# Patient Record
Sex: FEMALE | Race: WHITE | NOT HISPANIC OR LATINO | Employment: OTHER | URBAN - METROPOLITAN AREA
[De-identification: names, ages, dates, MRNs, and addresses within clinical notes are randomized per-mention and may not be internally consistent; named-entity substitution may affect disease eponyms.]

---

## 2018-06-13 ENCOUNTER — TRANSFERRED RECORDS (OUTPATIENT)
Dept: HEALTH INFORMATION MANAGEMENT | Facility: CLINIC | Age: 73
End: 2018-06-13

## 2018-08-09 ENCOUNTER — TRANSFERRED RECORDS (OUTPATIENT)
Dept: HEALTH INFORMATION MANAGEMENT | Facility: CLINIC | Age: 73
End: 2018-08-09

## 2018-12-07 ENCOUNTER — TRANSFERRED RECORDS (OUTPATIENT)
Dept: HEALTH INFORMATION MANAGEMENT | Facility: CLINIC | Age: 73
End: 2018-12-07

## 2018-12-19 ENCOUNTER — TRANSFERRED RECORDS (OUTPATIENT)
Dept: HEALTH INFORMATION MANAGEMENT | Facility: CLINIC | Age: 73
End: 2018-12-19

## 2019-02-22 ENCOUNTER — TRANSFERRED RECORDS (OUTPATIENT)
Dept: HEALTH INFORMATION MANAGEMENT | Facility: CLINIC | Age: 74
End: 2019-02-22

## 2022-05-08 ENCOUNTER — HOSPITAL ENCOUNTER (OUTPATIENT)
Facility: CLINIC | Age: 77
Setting detail: OBSERVATION
Discharge: HOME | End: 2022-05-09
Attending: EMERGENCY MEDICINE | Admitting: INTERNAL MEDICINE
Payer: COMMERCIAL

## 2022-05-08 ENCOUNTER — APPOINTMENT (OUTPATIENT)
Dept: CT IMAGING | Facility: CLINIC | Age: 77
End: 2022-05-08
Attending: EMERGENCY MEDICINE
Payer: COMMERCIAL

## 2022-05-08 ENCOUNTER — APPOINTMENT (OUTPATIENT)
Dept: GENERAL RADIOLOGY | Facility: CLINIC | Age: 77
End: 2022-05-08
Attending: EMERGENCY MEDICINE
Payer: COMMERCIAL

## 2022-05-08 DIAGNOSIS — I30.1 ACUTE VIRAL PERICARDITIS: ICD-10-CM

## 2022-05-08 DIAGNOSIS — U07.1 INFECTION DUE TO 2019 NOVEL CORONAVIRUS: ICD-10-CM

## 2022-05-08 LAB
ALBUMIN SERPL-MCNC: 3.4 G/DL (ref 3.4–5)
ALP SERPL-CCNC: 52 U/L (ref 40–150)
ALT SERPL W P-5'-P-CCNC: 27 U/L (ref 0–50)
ANION GAP SERPL CALCULATED.3IONS-SCNC: 7 MMOL/L (ref 3–14)
AST SERPL W P-5'-P-CCNC: 30 U/L (ref 0–45)
ATRIAL RATE - MUSE: 74 BPM
BASOPHILS # BLD AUTO: 0 10E3/UL (ref 0–0.2)
BASOPHILS NFR BLD AUTO: 0 %
BILIRUB SERPL-MCNC: 0.5 MG/DL (ref 0.2–1.3)
BUN SERPL-MCNC: 29 MG/DL (ref 7–30)
CALCIUM SERPL-MCNC: 8.5 MG/DL (ref 8.5–10.1)
CHLORIDE BLD-SCNC: 99 MMOL/L (ref 94–109)
CO2 SERPL-SCNC: 26 MMOL/L (ref 20–32)
CREAT SERPL-MCNC: 0.9 MG/DL (ref 0.52–1.04)
CRP SERPL-MCNC: 18.8 MG/L (ref 0–8)
D DIMER PPP FEU-MCNC: 1.05 UG/ML FEU (ref 0–0.5)
DIASTOLIC BLOOD PRESSURE - MUSE: NORMAL MMHG
EOSINOPHIL # BLD AUTO: 0 10E3/UL (ref 0–0.7)
EOSINOPHIL NFR BLD AUTO: 1 %
ERYTHROCYTE [DISTWIDTH] IN BLOOD BY AUTOMATED COUNT: 12.9 % (ref 10–15)
ERYTHROCYTE [SEDIMENTATION RATE] IN BLOOD BY WESTERGREN METHOD: 36 MM/HR (ref 0–30)
GFR SERPL CREATININE-BSD FRML MDRD: 65 ML/MIN/1.73M2
GLUCOSE BLD-MCNC: 82 MG/DL (ref 70–99)
HCT VFR BLD AUTO: 35.5 % (ref 35–47)
HGB BLD-MCNC: 12.1 G/DL (ref 11.7–15.7)
IMM GRANULOCYTES # BLD: 0 10E3/UL
IMM GRANULOCYTES NFR BLD: 0 %
INTERPRETATION ECG - MUSE: NORMAL
LYMPHOCYTES # BLD AUTO: 1.3 10E3/UL (ref 0.8–5.3)
LYMPHOCYTES NFR BLD AUTO: 26 %
MCH RBC QN AUTO: 32.2 PG (ref 26.5–33)
MCHC RBC AUTO-ENTMCNC: 34.1 G/DL (ref 31.5–36.5)
MCV RBC AUTO: 94 FL (ref 78–100)
MONOCYTES # BLD AUTO: 1 10E3/UL (ref 0–1.3)
MONOCYTES NFR BLD AUTO: 20 %
NEUTROPHILS # BLD AUTO: 2.6 10E3/UL (ref 1.6–8.3)
NEUTROPHILS NFR BLD AUTO: 53 %
NRBC # BLD AUTO: 0 10E3/UL
NRBC BLD AUTO-RTO: 0 /100
P AXIS - MUSE: 64 DEGREES
PLATELET # BLD AUTO: 234 10E3/UL (ref 150–450)
POTASSIUM BLD-SCNC: 3.8 MMOL/L (ref 3.4–5.3)
PR INTERVAL - MUSE: 148 MS
PROT SERPL-MCNC: 7.5 G/DL (ref 6.8–8.8)
QRS DURATION - MUSE: 82 MS
QT - MUSE: 370 MS
QTC - MUSE: 410 MS
R AXIS - MUSE: 35 DEGREES
RBC # BLD AUTO: 3.76 10E6/UL (ref 3.8–5.2)
SODIUM SERPL-SCNC: 132 MMOL/L (ref 133–144)
SYSTOLIC BLOOD PRESSURE - MUSE: NORMAL MMHG
T AXIS - MUSE: 58 DEGREES
TROPONIN I SERPL HS-MCNC: 8 NG/L
VENTRICULAR RATE- MUSE: 74 BPM
WBC # BLD AUTO: 5.1 10E3/UL (ref 4–11)

## 2022-05-08 PROCEDURE — 99285 EMERGENCY DEPT VISIT HI MDM: CPT | Mod: 25

## 2022-05-08 PROCEDURE — 250N000009 HC RX 250: Performed by: EMERGENCY MEDICINE

## 2022-05-08 PROCEDURE — 85652 RBC SED RATE AUTOMATED: CPT | Performed by: EMERGENCY MEDICINE

## 2022-05-08 PROCEDURE — 86140 C-REACTIVE PROTEIN: CPT | Performed by: EMERGENCY MEDICINE

## 2022-05-08 PROCEDURE — 71275 CT ANGIOGRAPHY CHEST: CPT

## 2022-05-08 PROCEDURE — 250N000011 HC RX IP 250 OP 636: Performed by: EMERGENCY MEDICINE

## 2022-05-08 PROCEDURE — 85025 COMPLETE CBC W/AUTO DIFF WBC: CPT | Performed by: EMERGENCY MEDICINE

## 2022-05-08 PROCEDURE — 94640 AIRWAY INHALATION TREATMENT: CPT

## 2022-05-08 PROCEDURE — 36415 COLL VENOUS BLD VENIPUNCTURE: CPT | Performed by: EMERGENCY MEDICINE

## 2022-05-08 PROCEDURE — 80053 COMPREHEN METABOLIC PANEL: CPT | Performed by: EMERGENCY MEDICINE

## 2022-05-08 PROCEDURE — 87040 BLOOD CULTURE FOR BACTERIA: CPT | Performed by: EMERGENCY MEDICINE

## 2022-05-08 PROCEDURE — 85379 FIBRIN DEGRADATION QUANT: CPT | Performed by: EMERGENCY MEDICINE

## 2022-05-08 PROCEDURE — 84484 ASSAY OF TROPONIN QUANT: CPT | Performed by: EMERGENCY MEDICINE

## 2022-05-08 PROCEDURE — 71045 X-RAY EXAM CHEST 1 VIEW: CPT

## 2022-05-08 PROCEDURE — 93005 ELECTROCARDIOGRAM TRACING: CPT

## 2022-05-08 PROCEDURE — C9803 HOPD COVID-19 SPEC COLLECT: HCPCS

## 2022-05-08 RX ORDER — CALCIUM CARBONATE 500(1250)
1 TABLET ORAL 2 TIMES DAILY
COMMUNITY

## 2022-05-08 RX ORDER — IPRATROPIUM BROMIDE AND ALBUTEROL SULFATE 2.5; .5 MG/3ML; MG/3ML
3 SOLUTION RESPIRATORY (INHALATION) ONCE
Status: COMPLETED | OUTPATIENT
Start: 2022-05-08 | End: 2022-05-08

## 2022-05-08 RX ORDER — IOPAMIDOL 755 MG/ML
58 INJECTION, SOLUTION INTRAVASCULAR ONCE
Status: COMPLETED | OUTPATIENT
Start: 2022-05-08 | End: 2022-05-08

## 2022-05-08 RX ADMIN — IOPAMIDOL 58 ML: 755 INJECTION, SOLUTION INTRAVENOUS at 22:57

## 2022-05-08 RX ADMIN — IPRATROPIUM BROMIDE AND ALBUTEROL SULFATE 3 ML: .5; 3 SOLUTION RESPIRATORY (INHALATION) at 21:58

## 2022-05-08 RX ADMIN — SODIUM CHLORIDE 85 ML: 900 INJECTION INTRAVENOUS at 22:57

## 2022-05-08 ASSESSMENT — ENCOUNTER SYMPTOMS
VOMITING: 0
CHILLS: 0
SHORTNESS OF BREATH: 1
WHEEZING: 1
COUGH: 1
FATIGUE: 1
DIARRHEA: 1
FEVER: 0
SORE THROAT: 1

## 2022-05-08 NOTE — ED TRIAGE NOTES
Patient reports coughing - productive. History of lung and heart problems. Patient reports some chest pressure yesterday. Now improved. Patient reports difficulty clearing lungs and feels like she is choking. Patient reports recent Covid diagnosis today. Symptoms started last Thursday afternoon.      Triage Assessment     Row Name 05/08/22 1800       Triage Assessment (Adult)    Airway WDL WDL    Additional Documentation --  Patient reports asthma and wheezing       Respiratory WDL    Respiratory WDL --  patient reports shortness of breath - worse with activity       Skin Circulation/Temperature WDL    Skin Circulation/Temperature WDL --  Patient reports rash       Cardiac WDL    Cardiac WDL WDL       Cognitive/Neuro/Behavioral WDL    Cognitive/Neuro/Behavioral WDL WDL

## 2022-05-09 ENCOUNTER — APPOINTMENT (OUTPATIENT)
Dept: CARDIOLOGY | Facility: CLINIC | Age: 77
End: 2022-05-09
Attending: INTERNAL MEDICINE
Payer: COMMERCIAL

## 2022-05-09 VITALS
HEIGHT: 64 IN | HEART RATE: 73 BPM | OXYGEN SATURATION: 95 % | TEMPERATURE: 99 F | WEIGHT: 143.96 LBS | RESPIRATION RATE: 16 BRPM | SYSTOLIC BLOOD PRESSURE: 119 MMHG | BODY MASS INDEX: 24.58 KG/M2 | DIASTOLIC BLOOD PRESSURE: 63 MMHG

## 2022-05-09 PROBLEM — U07.1 INFECTION DUE TO 2019 NOVEL CORONAVIRUS: Status: ACTIVE | Noted: 2022-05-09

## 2022-05-09 PROBLEM — I30.1 ACUTE VIRAL PERICARDITIS: Status: ACTIVE | Noted: 2022-05-09

## 2022-05-09 LAB
CK SERPL-CCNC: 136 U/L (ref 30–225)
CRP SERPL-MCNC: 13.3 MG/L (ref 0–8)
CRP SERPL-MCNC: 13.5 MG/L (ref 0–8)
ERYTHROCYTE [SEDIMENTATION RATE] IN BLOOD BY WESTERGREN METHOD: 35 MM/HR (ref 0–30)
LVEF ECHO: NORMAL
SODIUM SERPL-SCNC: 133 MMOL/L (ref 133–144)
TROPONIN I SERPL HS-MCNC: 10 NG/L
TROPONIN I SERPL HS-MCNC: 11 NG/L
TROPONIN I SERPL HS-MCNC: 13 NG/L

## 2022-05-09 PROCEDURE — 93325 DOPPLER ECHO COLOR FLOW MAPG: CPT | Mod: 26 | Performed by: INTERNAL MEDICINE

## 2022-05-09 PROCEDURE — 93321 DOPPLER ECHO F-UP/LMTD STD: CPT | Mod: 26 | Performed by: INTERNAL MEDICINE

## 2022-05-09 PROCEDURE — 84484 ASSAY OF TROPONIN QUANT: CPT | Mod: 91 | Performed by: INTERNAL MEDICINE

## 2022-05-09 PROCEDURE — 86140 C-REACTIVE PROTEIN: CPT | Performed by: HOSPITALIST

## 2022-05-09 PROCEDURE — 255N000002 HC RX 255 OP 636: Performed by: HOSPITALIST

## 2022-05-09 PROCEDURE — 999N000208 ECHOCARDIOGRAM LIMITED

## 2022-05-09 PROCEDURE — G0378 HOSPITAL OBSERVATION PER HR: HCPCS

## 2022-05-09 PROCEDURE — 99236 HOSP IP/OBS SAME DATE HI 85: CPT | Performed by: INTERNAL MEDICINE

## 2022-05-09 PROCEDURE — 250N000011 HC RX IP 250 OP 636: Performed by: HOSPITALIST

## 2022-05-09 PROCEDURE — 99204 OFFICE O/P NEW MOD 45 MIN: CPT | Mod: 25 | Performed by: INTERNAL MEDICINE

## 2022-05-09 PROCEDURE — 36415 COLL VENOUS BLD VENIPUNCTURE: CPT | Performed by: INTERNAL MEDICINE

## 2022-05-09 PROCEDURE — 84295 ASSAY OF SERUM SODIUM: CPT | Performed by: HOSPITALIST

## 2022-05-09 PROCEDURE — 36415 COLL VENOUS BLD VENIPUNCTURE: CPT | Performed by: EMERGENCY MEDICINE

## 2022-05-09 PROCEDURE — C8924 2D TTE W OR W/O FOL W/CON,FU: HCPCS

## 2022-05-09 PROCEDURE — 86140 C-REACTIVE PROTEIN: CPT | Performed by: INTERNAL MEDICINE

## 2022-05-09 PROCEDURE — 36415 COLL VENOUS BLD VENIPUNCTURE: CPT | Performed by: HOSPITALIST

## 2022-05-09 PROCEDURE — 99207 PR CDG-CODE CATEGORY CHANGED: CPT | Performed by: INTERNAL MEDICINE

## 2022-05-09 PROCEDURE — 93308 TTE F-UP OR LMTD: CPT | Mod: 26 | Performed by: INTERNAL MEDICINE

## 2022-05-09 PROCEDURE — 82550 ASSAY OF CK (CPK): CPT | Performed by: HOSPITALIST

## 2022-05-09 PROCEDURE — 85652 RBC SED RATE AUTOMATED: CPT | Performed by: INTERNAL MEDICINE

## 2022-05-09 PROCEDURE — 87040 BLOOD CULTURE FOR BACTERIA: CPT | Performed by: EMERGENCY MEDICINE

## 2022-05-09 PROCEDURE — 99207 PR NO CHARGE LOS: CPT | Performed by: HOSPITALIST

## 2022-05-09 PROCEDURE — 84484 ASSAY OF TROPONIN QUANT: CPT | Performed by: HOSPITALIST

## 2022-05-09 PROCEDURE — 250N000013 HC RX MED GY IP 250 OP 250 PS 637: Performed by: EMERGENCY MEDICINE

## 2022-05-09 PROCEDURE — 96372 THER/PROPH/DIAG INJ SC/IM: CPT | Performed by: HOSPITALIST

## 2022-05-09 RX ORDER — AMOXICILLIN 250 MG
2 CAPSULE ORAL 2 TIMES DAILY PRN
Status: DISCONTINUED | OUTPATIENT
Start: 2022-05-09 | End: 2022-05-09 | Stop reason: HOSPADM

## 2022-05-09 RX ORDER — ENOXAPARIN SODIUM 100 MG/ML
40 INJECTION SUBCUTANEOUS EVERY 24 HOURS
Status: DISCONTINUED | OUTPATIENT
Start: 2022-05-09 | End: 2022-05-09 | Stop reason: HOSPADM

## 2022-05-09 RX ORDER — PROCHLORPERAZINE MALEATE 5 MG
5 TABLET ORAL EVERY 6 HOURS PRN
Status: DISCONTINUED | OUTPATIENT
Start: 2022-05-09 | End: 2022-05-09 | Stop reason: HOSPADM

## 2022-05-09 RX ORDER — ACETAMINOPHEN 325 MG/1
650 TABLET ORAL EVERY 6 HOURS PRN
Status: DISCONTINUED | OUTPATIENT
Start: 2022-05-09 | End: 2022-05-09 | Stop reason: HOSPADM

## 2022-05-09 RX ORDER — PROCHLORPERAZINE 25 MG
12.5 SUPPOSITORY, RECTAL RECTAL EVERY 12 HOURS PRN
Status: DISCONTINUED | OUTPATIENT
Start: 2022-05-09 | End: 2022-05-09 | Stop reason: HOSPADM

## 2022-05-09 RX ORDER — LIDOCAINE 40 MG/G
CREAM TOPICAL
Status: DISCONTINUED | OUTPATIENT
Start: 2022-05-09 | End: 2022-05-09 | Stop reason: HOSPADM

## 2022-05-09 RX ORDER — ONDANSETRON 2 MG/ML
4 INJECTION INTRAMUSCULAR; INTRAVENOUS EVERY 6 HOURS PRN
Status: DISCONTINUED | OUTPATIENT
Start: 2022-05-09 | End: 2022-05-09 | Stop reason: HOSPADM

## 2022-05-09 RX ORDER — COLCHICINE 0.6 MG/1
1.2 TABLET ORAL ONCE
Status: COMPLETED | OUTPATIENT
Start: 2022-05-09 | End: 2022-05-09

## 2022-05-09 RX ORDER — AMOXICILLIN 250 MG
1 CAPSULE ORAL 2 TIMES DAILY PRN
Status: DISCONTINUED | OUTPATIENT
Start: 2022-05-09 | End: 2022-05-09 | Stop reason: HOSPADM

## 2022-05-09 RX ORDER — ACETAMINOPHEN 650 MG/1
650 SUPPOSITORY RECTAL EVERY 6 HOURS PRN
Status: DISCONTINUED | OUTPATIENT
Start: 2022-05-09 | End: 2022-05-09 | Stop reason: HOSPADM

## 2022-05-09 RX ORDER — BISACODYL 10 MG
10 SUPPOSITORY, RECTAL RECTAL DAILY PRN
Status: DISCONTINUED | OUTPATIENT
Start: 2022-05-09 | End: 2022-05-09 | Stop reason: HOSPADM

## 2022-05-09 RX ORDER — NITROGLYCERIN 0.4 MG/1
0.4 TABLET SUBLINGUAL EVERY 5 MIN PRN
Status: DISCONTINUED | OUTPATIENT
Start: 2022-05-09 | End: 2022-05-09 | Stop reason: HOSPADM

## 2022-05-09 RX ORDER — ONDANSETRON 4 MG/1
4 TABLET, ORALLY DISINTEGRATING ORAL EVERY 6 HOURS PRN
Status: DISCONTINUED | OUTPATIENT
Start: 2022-05-09 | End: 2022-05-09 | Stop reason: HOSPADM

## 2022-05-09 RX ADMIN — COLCHICINE 1.2 MG: 0.6 TABLET ORAL at 01:00

## 2022-05-09 RX ADMIN — HUMAN ALBUMIN MICROSPHERES AND PERFLUTREN 9 ML: 10; .22 INJECTION, SOLUTION INTRAVENOUS at 12:02

## 2022-05-09 NOTE — H&P
Admitted: 05/08/2022    PRIMARY CARE PHYSICIAN:  Dr. Saravia at [facility].     CODE STATUS:  Full code.  Discussed with the patient.    CHIEF COMPLAINT:  COVID and chest tightness.    HISTORY OF PRESENT ILLNESS:  Ms. Fry is a 77-year-old female with a past medical history significant for asthma, dyslipidemia, osteoarthritis, who presents to the Emergency Department with the above concerns.  History is obtained through discussion with the ED physician as well as the patient.      The patient states that she was exposed to her grandchild little over a week ago on Monday, who tested positive for COVID.  She arranged to get a PCR test a few days later on Thursday, which was negative.  A few hours after she got the COVID test, she started having symptoms of nasal congestion, which progressed to a tickle in her throat and coughing, which is occasionally productive of clear sputum.  She has not felt particularly short of breath, but the cough has been substantial.  She took home COVID test today, which was positive.  She also developed some chest tightness this morning, but not pain.  No nausea or radiation of the chest tightness.  No diaphoresis, though she did have a temperature of 99 degrees.  She came into the hospital because of the positive COVID test and her symptoms.  She has not required oxygen.      She denies any abdominal pain, nausea, change in her bowel or urinary habits.  She does have some lower extremity edema, which she describes as a progressive throughout the day when she is either walking or sitting and then it gets better throughout the night and is completely resolved in the morning.  She does tell me she has seen a cardiologist in the past and an echocardiogram that showed some sort of valvular issue, but was told she could follow up in approximately three years and there is no intervention.    In the Emergency Department, the patient had elevated inflammatory markers including CRP and D-dimer, which  prompted a CT of the chest, which was negative for any PE or infiltrate, but did show a small pericardial effusion.  This was discussed with cardiology who recommended that she be brought in for echocardiogram.  Troponin was negative.  Chest x-ray also negative for any acute abnormalities, but did show some artifacts versus air in the esophagus, noted to be a lucency along the medial chest wall.  The patient is currently resting fairly comfortably.    PAST MEDICAL HISTORY:    1.  Asthma.  2.  Dyslipidemia.  3.  Postherpetic neuralgia.  4.  Osteopenia.  5.  Osteoarthritis.  6.  GERD.    MEDICATIONS:    Prior to Admission medications    Medication Sig Last Dose Taking? Auth Provider   albuterol (ALBUTEROL) 108 (90 BASE) MCG/ACT inhaler Inhale 2 puffs into the lungs every 6 hours as needed More than a month at Unknown time Yes Edouard Bustamante MD   calcium carbonate (OS-DENI) 500 MG tablet Take 1 tablet by mouth 2 times daily 5/8/2022 at Unknown time Yes Reported, Patient   fluticasone (FLONASE) 50 MCG/ACT nasal spray Spray 1-2 sprays into both nostrils daily More than a month at Unknown time Yes Edouard Bustamante MD   fluticasone-salmeterol (ADVAIR DISKUS) 250-50 MCG/DOSE diskus inhaler Inhale 1 puff into the lungs every 12 hours as needed Past Week at Unknown time Yes Edouard Bustamante MD   lovastatin (MEVACOR) 40 MG tablet Take 1 tablet (40 mg) by mouth daily 5/8/2022 at Unknown time Yes Scarlet Arce PA-C   MULTI-VITAMIN OR TABS 1 TABLET DAILY 5/8/2022 at Unknown time Yes Reported, Patient   Omega-3 Fatty Acids (EPA PO) Take 1 teaspoonful by mouth daily 5/8/2022 at Unknown time Yes Reported, Patient   omeprazole (PRILOSEC) 40 MG capsule Take 1 capsule (40 mg) by mouth daily Take 30-60 minutes before a meal. More than a month at Unknown time Yes Edouard Bustamante MD         SOCIAL HISTORY:  The patient denies any use of tobacco or alcohol.    FAMILY HISTORY:  Mother had heart  valve issues and rheumatoid arthritis.  Father had a pacer.    ALLERGIES:  SULFA, LEVAQUIN, ASPIRIN, ERYTHROMYCIN, NSAIDS.    REVIEW OF SYSTEMS:  A complete review of systems reviewed and negative, save for the pertinent positives recorded in HPI.    PHYSICAL EXAMINATION:    VITAL SIGNS:  Show blood pressure of 149/83, heart rate 74, respirations 16, satting 97% on room air, temperature 97.3 degrees Fahrenheit.  GENERAL:  The patient is sitting in bed and appears comfortable.  HEENT:  Pupils equal, round, reactive to light.  Extraocular muscle function intact.  No scleral icterus.  Oropharynx is clear.  NECK:  No lymphadenopathy or thyromegaly.  CARDIOVASCULAR:  Heart is regular rate and rhythm without any appreciable murmur, rub, or gallop.  No rub is noted.  GASTROINTESTINAL:  Positive bowel sounds, soft, nontender and nondistended.  SKIN:  No new rashes or lesions.  LYMPHATICS:  Trace edema of the bilateral lower extremities.  PSYCHIATRIC:  Alert and oriented x 3, normal affect.  NEUROLOGIC:  Cranial nerves II through XII are grossly intact.  No focal deficits.    LABORATORY DATA:  CBC, BMP, LFTs are unremarkable save for a sodium of 132.  CRP is 18.8 and sed rate is 36.      CT is negative for acute issues as above, except for a small pericardial effusion.      Chest x-ray shows the medial lucency along the chest, which could be artifactual related to air in the esophagus.    IMPRESSION AND PLAN:  Ms. Fry is a 77-year-old female with a past medical history significant for asthma, dyslipidemia, osteopenia, who presents to the Emergency Department with chest tightness and COVID positive.    1.  COVID infection:  The patient has minimal respiratory symptoms other than cough at this point and does not have any evidence of pneumonia, nor hypoxia.  Supportive treatment is indicated at this point.  2.  Small pericardial effusion:  This was noted on CT and there was some concern about whether this is potentially  related to COVID.  In looking back through Care Everywhere, I note that she had an echocardiogram in 10/2021, which showed a sclerotic aortic valve and also a trivial pericardial effusion at that point.  Certainly worth repeating echocardiogram in the morning with cardiology input to determine whether any additional workup or followup is required at this point.  EKG is nonischemic, but we will check serial troponins and monitor on telemetry.  3.  Asthma:  She is only taking PRNs at this point.  I saw she discontinued Advair about 3-6 months ago.  Can use albuterol inhalers as needed.  4.  Dyslipidemia:  Can resume statin upon discharge.  5.  Gastroesophageal reflux disease:  Continue with proton pump inhibitor.  6. Lucency along the medial chest wall:  Per chest x-ray.  This could be artifactual versus air in the esophagus.  Recommend short-term followup via repeat chest x-ray.  7.  Disposition:  The patient was brought under observation status and potentially could discharge home later on today, if echocardiogram is unremarkable and no further cardiac workup if needed.    Felix Lira DO        D: 2022   T: 2022   MT: [email or web address]    Name:     PERSONLILIANE  MRN:      [id number]        Account:     [id number]   :      1945           Admitted:    2022       Document: [id number]

## 2022-05-09 NOTE — PLAN OF CARE
Goal Outcome Evaluation:    Plan of Care Reviewed With: patient     Overall Patient Progress: improving    Discharge    Patient discharged to home via w/c with spouse  Care plan note  Summary:  COVID +, possible acute viral pericarditis   DATE & TIME: 5/9/22 8021-8731  Cognitive Concerns/ Orientation : A&O x4   BEHAVIOR & AGGRESSION TOOL COLOR: Green  CIWA SCORE: NA   ABNL VS/O2: VSS RA, O2 sats 95% RA, LS clear and exp grunting, intermittent productive coughs.   MOBILITY: IND, steady   PAIN MANAGMENT: Denies  DIET: Regular  BOWEL/BLADDER: incontinent at tmes.   ABNL LAB/BG: CRP 13.8 (18.8)CK total 136, d-dimer 1.05   DRAIN/DEVICES: PIV acccess x1 removed prior to discharge   TELEMETRY RHYTHM: NSR  SKIN: Intact, WNL  TESTS/PROCEDURES: s/o Echo complete today  D/C DAY/GOALS/PLACE: Discharged home today at 1700, pt's spouse provided transportation.   OTHER IMPORTANT INFO: Special precautions for COVID +. Echo complete done, cardiology recommended follow up Echo in 6-8 weeks, cleared for discharge. Went through AVS, pt verbalized understanding. Belongings sent with the pt.           Listed belongings gathered and given to patient (including from security/pharmacy). Yes  Care Plan and Patient education resolved: Yes  Prescriptions if needed, hard copies sent with patient  NA  Medication Bin checked and emptied on discharge Yes  SW/care coordinator/charge RN aware of discharge: Yes

## 2022-05-09 NOTE — PLAN OF CARE
Summary:  COVID +, possible acute viral pericarditis   DATE & TIME: 5/09/22 3329-2350   Cognitive Concerns/ Orientation : A&O x4   BEHAVIOR & AGGRESSION TOOL COLOR: Green  CIWA SCORE: NA   ABNL VS/O2: VSS RA, O2 sats 95-96%  MOBILITY: IND  PAIN MANAGMENT: Denies  DIET: Regular  BOWEL/BLADDER: Incontinent bladder at times per pt, no BM this shift.  ABNL LAB/BG: Na+ 132. CRP 18.8, d-dimer 1.05   DRAIN/DEVICES: PIV saline locked  TELEMETRY RHYTHM: NSR  SKIN: Intact, WNL  TESTS/PROCEDURES: Echo today  D/C DAY/GOALS/PLACE: Discharge pending cardiac work up.  OTHER IMPORTANT INFO: Special precautions for COVID +; Cardiology consulted.

## 2022-05-09 NOTE — ED PROVIDER NOTES
History   Chief Complaint:  Shortness of Breath      HPI   Darleen Fry is a 77 year old female who presents with dyspnea.  The patient believes she was exposed to COVID-19 8 days ago.  She had a test 2 days later that was negative.  Today she is on her fourth day of symptoms which began with nasal congestion and sore throat and is progressed to cough.  She had a home COVID test today that was positive.  She has a history of asthma but has been off of her Advair.  She had previously used prednisone but does not use this either.  She has complained of some wheezing and shortness of breath similar to her asthma.  She is never had a DVT or PE.  No leg swelling or hemoptysis or calf tenderness.  She is not having chest pain.  No vomiting.  She has had some loose stools in the last 2 days.  No blood.    Review of Systems   Constitutional: Positive for fatigue. Negative for chills and fever.   HENT: Positive for congestion and sore throat.    Respiratory: Positive for cough, shortness of breath and wheezing.    Cardiovascular: Negative for chest pain and leg swelling.   Gastrointestinal: Positive for diarrhea. Negative for vomiting.   All other systems reviewed and are negative.        Allergies:      Alatrofloxacin Mesylate Injection  Aspirin  Cefzil  Erythromycin  Nsaids  Sulfa Drugs  Levaquin [Levofloxacin]      Medications:      albuterol (ALBUTEROL) 108 (90 BASE) MCG/ACT inhaler  calcium carbonate (OS-DENI) 500 MG tablet  fluticasone (FLONASE) 50 MCG/ACT nasal spray  fluticasone-salmeterol (ADVAIR DISKUS) 250-50 MCG/DOSE diskus inhaler  lovastatin (MEVACOR) 40 MG tablet  MULTI-VITAMIN OR TABS  Omega-3 Fatty Acids (EPA PO)  omeprazole (PRILOSEC) 40 MG capsule        Past Medical History:        Past Medical History:   Diagnosis Date     Asthma      Hyperlipidemia LDL goal <100      Osteopenia      Seasonal allergies      Patient Active Problem List    Diagnosis Date Noted     Acute viral pericarditis 05/09/2022      Priority: Medium     Infection due to 2019 novel coronavirus 05/09/2022     Priority: Medium     Osteopenia 10/09/2013     Priority: Medium     Microscopic hematuria 10/07/2013     Priority: Medium     Urinary urgency 10/07/2013     Priority: Medium     Myalgia and myositis 10/07/2013     Priority: Medium     Low back pain 10/07/2013     Priority: Medium     Diagnosis updated by automated process. Provider to review and confirm.       Hyperlipidemia LDL goal <100 06/03/2013     Priority: Medium     Upper arm pain 01/09/2013     Priority: Medium     Other diseases of nasal cavity and sinuses(478.19) 09/22/2012     Priority: Medium     Constipation 09/22/2012     Priority: Medium     Problem list name updated by automated process. Provider to review       Urinary frequency 09/22/2012     Priority: Medium     Irritable bowel syndrome 09/22/2012     Priority: Medium     Hypersomnia with sleep apnea 09/22/2012     Priority: Medium     Problem list name updated by automated process. Provider to review       Moderate persistent asthma 09/22/2012     Priority: Medium     Obsessive-compulsive disorder 09/22/2012     Priority: Medium     Problem list name updated by automated process. Provider to review       Anxiety state 09/22/2012     Priority: Medium     Problem list name updated by automated process. Provider to review       Deficiency of other vitamins 09/22/2012     Priority: Medium     Sleep apnea 09/22/2012     Priority: Medium     Contact dermatitis and other eczema, due to unspecified cause 09/22/2012     Priority: Medium        Past Surgical History:        Past Surgical History:   Procedure Laterality Date     D & C  2010      NASAL SURG PROC UNLISTED  1989 - POLYPS X 7 - 8/ 08/2007      TOOTH EXTRACTION W/FORCEP       SURGERY GENERAL IP CONSULT  Urethal caruncle       Family History:        Family History   Problem Relation Age of Onset     Prostate Cancer Father      Arthritis Son         gout      "Connective Tissue Disorder Daughter         SLE     Breast Cancer No family hx of        Social History:  No tobacco, alcohol, or drug abuse    Physical Exam     Patient Vitals for the past 24 hrs:   BP Temp Temp src Pulse Resp SpO2 Height Weight   05/08/22 2200 -- -- -- 71 23 96 % -- --   05/08/22 1804 (!) 154/88 -- -- 76 -- 99 % -- --   05/08/22 1759 -- 97.3  F (36.3  C) Temporal -- 20 -- 1.626 m (5' 4\") 65.3 kg (143 lb 15.4 oz)       Physical Exam  Constitutional:       General: She is not in acute distress.     Appearance: She is not diaphoretic.   HENT:      Head: Atraumatic.      Mouth/Throat:      Pharynx: No oropharyngeal exudate.   Eyes:      General: No scleral icterus.     Pupils: Pupils are equal, round, and reactive to light.   Cardiovascular:      Rate and Rhythm: Normal rate and regular rhythm.      Heart sounds: Normal heart sounds.   Pulmonary:      Effort: No respiratory distress.      Comments: Mild tachypnea.  Scattered wheezes.  Coughs during the exam.  Abdominal:      General: Bowel sounds are normal.      Palpations: Abdomen is soft.      Tenderness: There is no abdominal tenderness.   Musculoskeletal:         General: No tenderness.      Right lower leg: No tenderness. No edema.      Left lower leg: No tenderness. No edema.   Skin:     General: Skin is warm.      Capillary Refill: Capillary refill takes less than 2 seconds.      Findings: No rash.   Neurological:      General: No focal deficit present.      Mental Status: She is disoriented.   Psychiatric:         Mood and Affect: Mood normal.         Behavior: Behavior normal.           Emergency Department Course   ECG:   Rate 74.  Normal sinus rhythm.  Intervals normal.  No acute ST or T wave changes.  No change compared to October 1, 2012.  Imaging:  CT Chest Pulmonary Embolism w Contrast   Final Result   IMPRESSION:   1.  No infiltrate, pleural effusion or pulmonary embolism.   2.  Small pericardial effusion.      XR Chest Port 1 View "   Final Result   IMPRESSION: Stable cardiomediastinal silhouette. No focal consolidation or pleural effusion. Lucency along the medial chest favored to be artifactual or related to air within the esophagus. Short-term PA follow-up suggested for confirmation.        Report per radiology    Laboratory:  Labs Ordered and Resulted from Time of ED Arrival to Time of ED Departure   COMPREHENSIVE METABOLIC PANEL - Abnormal       Result Value    Sodium 132 (*)     Potassium 3.8      Chloride 99      Carbon Dioxide (CO2) 26      Anion Gap 7      Urea Nitrogen 29      Creatinine 0.90      Calcium 8.5      Glucose 82      Alkaline Phosphatase 52      AST 30      ALT 27      Protein Total 7.5      Albumin 3.4      Bilirubin Total 0.5      GFR Estimate 65     D DIMER QUANTITATIVE - Abnormal    D-Dimer Quantitative 1.05 (*)    CRP INFLAMMATION - Abnormal    CRP Inflammation 18.8 (*)    ERYTHROCYTE SEDIMENTATION RATE AUTO - Abnormal    Erythrocyte Sedimentation Rate 36 (*)    CBC WITH PLATELETS AND DIFFERENTIAL - Abnormal    WBC Count 5.1      RBC Count 3.76 (*)     Hemoglobin 12.1      Hematocrit 35.5      MCV 94      MCH 32.2      MCHC 34.1      RDW 12.9      Platelet Count 234      % Neutrophils 53      % Lymphocytes 26      % Monocytes 20      % Eosinophils 1      % Basophils 0      % Immature Granulocytes 0      NRBCs per 100 WBC 0      Absolute Neutrophils 2.6      Absolute Lymphocytes 1.3      Absolute Monocytes 1.0      Absolute Eosinophils 0.0      Absolute Basophils 0.0      Absolute Immature Granulocytes 0.0      Absolute NRBCs 0.0     TROPONIN I - Normal    Troponin I High Sensitivity 8     BLOOD CULTURE   BLOOD CULTURE     Medications   colchicine (COLCYRS) tablet 1.2 mg (has no administration in time range)   ipratropium - albuterol 0.5 mg/2.5 mg/3 mL (DUONEB) neb solution 3 mL (3 mLs Nebulization Given 5/8/22 2158)   iopamidol (ISOVUE-370) solution 58 mL (58 mLs Intravenous Given 5/8/22 2816)   CT scan flush (85 mLs  Intravenous Given 5/8/22 2257)       Disposition:  The patient was admitted to the hospital under the care of Dr. Lira.     Impression & Plan      Medical Decision Making:  This patient presents to the ED with shortness of breath and vague chest discomfort.  She tested positive for COVID-19 yesterday.  She is on day 4 of symptoms.  She is not hypoxic here or febrile.  She does have some wheezing that improved with a nebulizer treatment.  She is hemodynamically stable.  EKG does not show ischemic changes.  I am concerned for pericarditis however.  D-dimer is elevated.  While CT scan is negative for pulmonary embolism she does have a pericardial effusion.  Her inflammatory markers are elevated and I am concerned about pericarditis in the setting of her COVID-19 infection.  She is not hypotensive.  I did speak with Dr. Marcus who is covering cardiology and I appreciate his input.  Our plan will be for admission for observation to get an echocardiogram.  She will be initiated on colchicine today.  Nonsteroidal anti-inflammatory medications will be withheld at this point given her listed allergy.  She will be admitted by the hospitalist service.    There may be a component of an asthma exacerbation.  I will hold off on steroids until we can confirm whether or not she has pericarditis.  She says she has had pericarditis in the past.  I do not see an echocardiogram within the last 10 years or so to determine whether she has a chronic effusion.    Diagnosis:    ICD-10-CM    1. Infection due to 2019 novel coronavirus  U07.1    2. Acute viral pericarditis  I30.1        Discharge Medications:  New Prescriptions    No medications on file          Kendrick Engle MD  05/09/22 0020       Kendrick Engle MD  05/09/22 0021

## 2022-05-09 NOTE — H&P
Admitted: 2022    ADDENDUM    IMPRESSION AND PLAN:    Lucency along the medial chest wall:  Per chest x-ray.  This could be artifactual versus air in the esophagus.  Recommend short-term followup via repeat chest x-ray.    Felix Lira DO        D: 2022   T: 2022   MT: [id number]    Name:     LILIANE MILLER  MRN:      [id number]        Account:     [id number]   :      1945           Admitted:    2022       Document: [id number]

## 2022-05-09 NOTE — CONSULTS
[facility]  Cardiology Consultation     Date of Admission:  5/8/2022    Assessment & Plan   Darleen Fry is a 77 year old female with    1.  Small pericardial effusion, unlikely to be COVID pericarditis in the absence of chest pain  2.  COVID infection    Recommendation;   1.  Pericardial effusion is very small and anteriorly located.  Outside echocardiogram in 2021 also showed pericardial effusion.  Unable to compare due to lack of images.  However, it is reassuring that she does not have any chest pain and EKG does not show changes consistent with pericarditis.  Do not believe she needs colchicine or any additional work-up at this time.  Would recommend repeating an echocardiogram in 6 to 8 weeks for reassessment.  2.  No further recommendations from cardiac standpoint.  Will sign off.    Ankit Bland MD    Code Status    Full Code    Reason for Consult   Reason for consult: Pericardial effusion    Primary Care Physician   Physician No Ref-Primary    Chief Complaint   COVID infection    History of Present Illness   Darleen Fry is a 77 year old female with past medical history of asthma, dyslipidemia, osteoarthritis who presented to the emergency department for a positive COVID 19 test.  The patient was exposed to her grandson who tested positive for COVID.  In terms of symptoms she has runny nose, nonproductive cough and chest congestion.  They did a CT scan of the chest which showed small pericardial effusion for which cardiology has been consulted.  Troponins have been negative.  EKG is nonischemic.  Echocardiogram showed a very small anterior pericardial effusion, no valve disease or wall motion abnormality.  Blood work showed elevated inflammatory markers but were otherwise normal.  Minor coronary calcification on CT chest. LDL 99 mg/dl.     In terms of symptoms, the patient tells me that besides some sinus congestion, major runny nose and nonproductive cough she does not have  any other acute issues.  She however notes that she has what she describes as 'a lot of gas in the belly' which generally moves around.  I asked her multiple times about sharp, stabbing, positional chest pain which is hallmark of pericarditis.  Shedenies having nasal symptoms.     Patient Active Problem List   Diagnosis     Other diseases of nasal cavity and sinuses(218.19)     Constipation     Urinary frequency     Irritable bowel syndrome     Hypersomnia with sleep apnea     Moderate persistent asthma     Obsessive-compulsive disorder     Anxiety state     Deficiency of other vitamins     Sleep apnea     Contact dermatitis and other eczema, due to unspecified cause     Upper arm pain     Hyperlipidemia LDL goal <100     Microscopic hematuria     Urinary urgency     Myalgia and myositis     Low back pain     Osteopenia     Acute viral pericarditis     Infection due to 2019 novel coronavirus       Past Medical History   I have reviewed this patient's medical history and updated it with pertinent information if needed.   Past Medical History:   Diagnosis Date     Asthma      Hyperlipidemia LDL goal <100      Osteopenia      Seasonal allergies        Past Surgical History   I have reviewed this patient's surgical history and updated it with pertinent information if needed.  Past Surgical History:   Procedure Laterality Date     D & C        NASAL SURG PROC UNLISTED   - POLYPS X  - 2007      TOOTH EXTRACTION W/FORCEP       SURGERY GENERAL IP CONSULT  Urethal caruncle       Prior to Admission Medications   Prior to Admission Medications   Prescriptions Last Dose Informant Patient Reported? Taking?   MULTI-VITAMIN OR TABS 2022 at Unknown time  Yes Yes   Si TABLET DAILY   Omega-3 Fatty Acids (EPA PO) 2022 at Unknown time  Yes Yes   Sig: Take 1 teaspoonful by mouth daily   albuterol (ALBUTEROL) 108 (90 BASE) MCG/ACT inhaler More than a month at Unknown time  No Yes   Sig: Inhale 2 puffs into  the lungs every 6 hours as needed   calcium carbonate (OS-DENI) 500 MG tablet 5/8/2022 at Unknown time  Yes Yes   Sig: Take 1 tablet by mouth 2 times daily   fluticasone (FLONASE) 50 MCG/ACT nasal spray More than a month at Unknown time  No Yes   Sig: Spray 1-2 sprays into both nostrils daily   fluticasone-salmeterol (ADVAIR DISKUS) 250-50 MCG/DOSE diskus inhaler Past Week at Unknown time  No Yes   Sig: Inhale 1 puff into the lungs every 12 hours as needed   lovastatin (MEVACOR) 40 MG tablet 5/8/2022 at Unknown time  No Yes   Sig: Take 1 tablet (40 mg) by mouth daily   omeprazole (PRILOSEC) 40 MG capsule More than a month at Unknown time  No Yes   Sig: Take 1 capsule (40 mg) by mouth daily Take 30-60 minutes before a meal.      Facility-Administered Medications: None     Current Facility-Administered Medications   Medication Dose Route Frequency     enoxaparin ANTICOAGULANT  40 mg Subcutaneous Q24H     sodium chloride (PF)  3 mL Intracatheter Q8H     Current Facility-Administered Medications   Medication Last Rate     Allergies   Allergies   Allergen Reactions     Alatrofloxacin Mesylate Injection      Aspirin      Cefzil      Erythromycin      Allergic all mycins     Nsaids      Sulfa Drugs      Levaquin [Levofloxacin] Rash       Social History    reports that she has never smoked. She has never used smokeless tobacco. She reports previous alcohol use. She reports that she does not use drugs.    Family History   Family History   Problem Relation Age of Onset     Prostate Cancer Father      Arthritis Son         gout     Connective Tissue Disorder Daughter         SLE     Breast Cancer No family hx of        Review of Systems   The comprehensive 10 point Review of Systems is negative other than noted in the HPI or here.     Physical Exam   Temp: 98  F (36.7  C) Temp src: Oral BP: 127/55 Pulse: 77   Resp: 18 SpO2: 96 % O2 Device: None (Room air)    Vital Signs with Ranges  Temp:  [97.3  F (36.3  C)-99.4  F (37.4  C)]  98  F (36.7  C)  Pulse:  [71-80] 77  Resp:  [16-22] 18  BP: (119-154)/(55-88) 127/55  SpO2:  [94 %-99 %] 96 %  143 lbs 15.37 oz    Constitutional: Alert, no apparent distress,    Lungs: Normal bilateral breath sounds   Cardiovascular: Regular rate and rhythm, normal S1 and S2, and no murmur   Lymphm node  Neck No enlargement  No jugular vein extension or carotid bruit   Skin: Normal   Extremity: No edema       Data   Results for orders placed or performed during the hospital encounter of 05/08/22 (from the past 24 hour(s))   EKG 12 lead   Result Value Ref Range    Systolic Blood Pressure  mmHg    Diastolic Blood Pressure  mmHg    Ventricular Rate 74 BPM    Atrial Rate 74 BPM    ND Interval 148 ms    QRS Duration 82 ms     ms    QTc 410 ms    P Axis 64 degrees    R AXIS 35 degrees    T Axis 58 degrees    Interpretation ECG       Sinus rhythm  Normal ECG  When compared with ECG of 07-SEP-2006 15:41,  No significant change was found  Confirmed by GENERATED REPORT, COMPUTER ([id number]),  Bertha Carnes ([id number]) on 5/8/2022 7:50:17 PM     CBC with platelets differential    Narrative    The following orders were created for panel order CBC with platelets differential.  Procedure                               Abnormality         Status                     ---------                               -----------         ------                     CBC with platelets and d...[[id number]]  Abnormal            Final result                 Please view results for these tests on the individual orders.   Comprehensive metabolic panel   Result Value Ref Range    Sodium 132 (L) 133 - 144 mmol/L    Potassium 3.8 3.4 - 5.3 mmol/L    Chloride 99 94 - 109 mmol/L    Carbon Dioxide (CO2) 26 20 - 32 mmol/L    Anion Gap 7 3 - 14 mmol/L    Urea Nitrogen 29 7 - 30 mg/dL    Creatinine 0.90 0.52 - 1.04 mg/dL    Calcium 8.5 8.5 - 10.1 mg/dL    Glucose 82 70 - 99 mg/dL    Alkaline Phosphatase 52 40 - 150 U/L    AST 30 0 - 45 U/L    ALT 27 0 - 50 U/L     Protein Total 7.5 6.8 - 8.8 g/dL    Albumin 3.4 3.4 - 5.0 g/dL    Bilirubin Total 0.5 0.2 - 1.3 mg/dL    GFR Estimate 65 >60 mL/min/1.73m2   D dimer quantitative   Result Value Ref Range    D-Dimer Quantitative 1.05 (H) 0.00 - 0.50 ug/mL FEU    Narrative    This D-dimer assay is intended for use in conjunction with a clinical pretest probability assessment model to exclude pulmonary embolism (PE) and deep venous thrombosis (DVT) in outpatients suspected of PE or DVT. The cut-off value is 0.50 ug/mL FEU.   Troponin I   Result Value Ref Range    Troponin I High Sensitivity 8 <54 ng/L   CRP inflammation   Result Value Ref Range    CRP Inflammation 18.8 (H) 0.0 - 8.0 mg/L   Erythrocyte sedimentation rate auto   Result Value Ref Range    Erythrocyte Sedimentation Rate 36 (H) 0 - 30 mm/hr   Blood Culture Line, venous    Specimen: Line, venous; Blood   Result Value Ref Range    Culture No growth after 12 hours    CBC with platelets and differential   Result Value Ref Range    WBC Count 5.1 4.0 - 11.0 10e3/uL    RBC Count 3.76 (L) 3.80 - 5.20 10e6/uL    Hemoglobin 12.1 11.7 - 15.7 g/dL    Hematocrit 35.5 35.0 - 47.0 %    MCV 94 78 - 100 fL    MCH 32.2 26.5 - 33.0 pg    MCHC 34.1 31.5 - 36.5 g/dL    RDW 12.9 10.0 - 15.0 %    Platelet Count 234 150 - 450 10e3/uL    % Neutrophils 53 %    % Lymphocytes 26 %    % Monocytes 20 %    % Eosinophils 1 %    % Basophils 0 %    % Immature Granulocytes 0 %    NRBCs per 100 WBC 0 <1 /100    Absolute Neutrophils 2.6 1.6 - 8.3 10e3/uL    Absolute Lymphocytes 1.3 0.8 - 5.3 10e3/uL    Absolute Monocytes 1.0 0.0 - 1.3 10e3/uL    Absolute Eosinophils 0.0 0.0 - 0.7 10e3/uL    Absolute Basophils 0.0 0.0 - 0.2 10e3/uL    Absolute Immature Granulocytes 0.0 <=0.4 10e3/uL    Absolute NRBCs 0.0 10e3/uL   XR Chest Port 1 View    Narrative    EXAM: XR CHEST PORT 1 VIEW  LOCATION: [facility]  DATE/TIME: 5/8/2022 10:08 PM    INDICATION: cough  COMPARISON: 09/07/2006       Impression    IMPRESSION: Stable cardiomediastinal silhouette. No focal consolidation or pleural effusion. Lucency along the medial chest favored to be artifactual or related to air within the esophagus. Short-term PA follow-up suggested for confirmation.   CT Chest Pulmonary Embolism w Contrast    Narrative    EXAM: CT CHEST PULMONARY EMBOLISM W CONTRAST  LOCATION: [facility]  DATE/TIME: 5/8/2022 10:44 PM    INDICATION: PE suspected, low/intermediate prob, positive D-dimer. Cough.  COMPARISON: None.  TECHNIQUE: CT chest pulmonary angiogram during arterial phase injection of IV contrast. Multiplanar reformats and MIP reconstructions were performed. Dose reduction techniques were used.   CONTRAST: 58mL Isovue 370.    FINDINGS:  ANGIOGRAM CHEST: Pulmonary arteries are normal caliber and negative for pulmonary emboli. Thoracic aorta is not well opacified and is  indeterminate for dissection. No CT evidence of right heart strain.    LUNGS AND PLEURA: No infiltrate or pleural effusion. Mild basilar fibroatelectasis.    MEDIASTINUM/AXILLAE: Small pericardial effusion.    CORONARY ARTERY CALCIFICATION: Mild.    UPPER ABDOMEN: Partial visualization left renal cyst.    MUSCULOSKELETAL: Degenerative change osseous structures.      Impression    IMPRESSION:  1.  No infiltrate, pleural effusion or pulmonary embolism.  2.  Small pericardial effusion.   Troponin I   Result Value Ref Range    Troponin I High Sensitivity 13 <54 ng/L   Troponin I   Result Value Ref Range    Troponin I High Sensitivity 11 <54 ng/L   CRP inflammation   Result Value Ref Range    CRP Inflammation 13.3 (H) 0.0 - 8.0 mg/L   Erythrocyte sedimentation rate auto   Result Value Ref Range    Erythrocyte Sedimentation Rate 35 (H) 0 - 30 mm/hr   Troponin I   Result Value Ref Range    Troponin I High Sensitivity 10 <54 ng/L   CRP inflammation   Result Value Ref Range    CRP Inflammation 13.5 (H) 0.0 - 8.0 mg/L   Sodium   Result Value Ref  Range    Sodium 133 133 - 144 mmol/L   CK total   Result Value Ref Range     30 - 225 U/L

## 2022-05-09 NOTE — PLAN OF CARE
RECEIVING UNIT ED HANDOFF REVIEW    ED Nurse Handoff Report was reviewed by: Nano Leija RN on May 9, 2022 at 1:07 AM

## 2022-05-09 NOTE — DISCHARGE SUMMARY
Discharge Summary  Hospitalist    Date of Admission:  5/8/2022  Date of Discharge:  5/9/2022  Discharging Provider: Tera Sainz MD, MD    Primary Care Physician   Physician No Ref-Primary  Primary Care Provider Phone Number: None  Primary Care Provider Fax Number: [phone]    PRINCIPAL DIAGNOSIS  COVID-19 infection.   Small pericardial effusion.  Incidental lucency along the medial chest wall:    Past Medical History:   Diagnosis Date     Asthma      Hyperlipidemia LDL goal <100      Osteopenia      Seasonal allergies        History of Present Illness   Darleen Fry is an 77 year old female who presented with cough symptoms.    Hospital Course     Ms. Fry is a 77-year-old female with asthma, dyslipidemia, osteopenia admitted with chest tightness and COVID-19 positive.      COVID-19 infection.   Tested positive on home test.  Grandchild recently tested positive for COVID.    Running nose.  Nonproductive cough present.  Loose stools 2 days prior to admission.   No hypoxia.   Chest x-ray, CT no evidence of pneumonia.  CT chest no PE.  Supportive treatment.  Aggressive incentive spirometry.  Recommend isolation for 5 days.      Small pericardial effusion.  No chest pain or palpitations.  EKG nonischemic.  Troponin x4 negative.  CRP 13.5, CK1 36.  Echocardiogram with small pericardial effusion.EF 60 to 65%.  Trace aortic regurgitation.  No hemodynamically significant valvular aortic stenosis.  Cardiology followed, did not recommend any colchicine therapy or additional work-up at this time.  Recommend repeat echocardiogram in 6 to 8 weeks.       Incidental lucency along the medial chest wall:  CXR Lucency along the medial chest favored to be artifactual or related to air within the esophagus.   CT ches lungs and pleura: No infiltrate or pleural effusion. Mild basilar fibroatelectasis.  Afebrile.  No leukocytosis.  Follow-up repeat imaging in 6 to 8 weeks.  Aggressive incentive spirometry     Chronic  sinusitis.  Asthma.  Continue PTA as needed inhaler therapy.     Dyslipidemia:   Continue PTA statin therapy.     GERD.  Continue PTA PPI.     Tera Sainz MD.    Pending Results   These results will be followed up by ordering provider.  Unresulted Labs Ordered in the Past 30 Days of this Admission     Date and Time Order Name Status Description    5/8/2022  9:28 PM Blood Culture Line, venous In process     5/8/2022  9:28 PM Blood Culture Line, venous Preliminary              Physical Exam   Vitals:    05/08/22 1759   Weight: 65.3 kg (143 lb 15.4 oz)     Vital Signs with Ranges  Temp:  [97.3  F (36.3  C)-99.4  F (37.4  C)] 98  F (36.7  C)  Pulse:  [71-80] 77  Resp:  [16-22] 18  BP: (119-154)/(55-88) 127/55  SpO2:  [94 %-99 %] 96 %  I/O last 3 completed shifts:  In: 240 [P.O.:240]  Out: -   PHYSICAL EXAM  GENERAL: Patient is in no distress. Alert and oriented.  HEENT: Oropharynx pink  LUNGS: Bilateral clear breath sounds.  No wheezing no crackles.  Respirations unlabored   CVS S1-S2 heard, no murmurs.  Abdomen soft nontender.  NEURO: Moving all extremities.    EXTREMITIES: No pedal edema.  Warm peripheries.  PSYCHIATRY Cooperative  )Consultations This Hospital Stay   CARDIOLOGY IP CONSULT    Time Spent on this Encounter   ITera MD, personally saw the patient today and spent greater than 30 minutes discharging this patient. Discussed with patient,  over the telephone, bedside RN.    Discharge Disposition   Discharged to home  Condition at discharge: Stable    Discharge Orders      Reason for your hospital stay    You were admitted for observation for chest tightness, Covid 19 infection. Noted to have small pericardial effusion, followed by cardiology.     Follow-up and recommended labs and tests     Follow up with primary care provider, Physician No Ref-Primary, within 7 days or earlier if symptomatic for hospital follow- up.  The following labs/tests are recommended: BMP, CRP.  Repeat chest  x-ray in 6 to 8 weeks.  Repeat echocardiogram in 6 to 8 weeks.  Age-appropriate health maintenance on PCP visit.     Activity    Your activity upon discharge: activity as tolerated     Discharge Instructions    Aggressive incentive spirometry.  Recommend isolation for 5 days.     Diet    Follow this diet upon discharge: Orders Placed This Encounter      Regular Diet Adult       Discharge Medications   Current Discharge Medication List      CONTINUE these medications which have NOT CHANGED    Details   albuterol (ALBUTEROL) 108 (90 BASE) MCG/ACT inhaler Inhale 2 puffs into the lungs every 6 hours as needed  Qty: 3 Inhaler, Refills: prn    Comments: Profile Rx: patient will contact pharmacy when needed  Associated Diagnoses: Moderate persistent asthma; Routine general medical examination at a health care facility; Unspecified vitamin D deficiency; Other diseases of nasal cavity and sinuses(478.19); Unspecified constipation; Urinary frequency; Irritable bowel syndrome; Hypersomnia with sleep apnea, unspecified; Obsessive-compulsive disorders; Anxiety state, unspecified; Upper arm pain, right; Hyperlipidemia LDL goal <100; Microscopic hematuria; Urinary urgency; Myalgia and myositis; Hyperlipidemia with target LDL less than 100; Paresthesias; LBP (low back pain)      calcium carbonate (OS-DENI) 500 MG tablet Take 1 tablet by mouth 2 times daily      fluticasone (FLONASE) 50 MCG/ACT nasal spray Spray 1-2 sprays into both nostrils daily  Qty: 3 Package, Refills: 3    Associated Diagnoses: Other diseases of nasal cavity and sinuses(478.19); Routine general medical examination at a health care facility; Unspecified vitamin D deficiency; Moderate persistent asthma; Unspecified constipation; Urinary frequency; Irritable bowel syndrome; Hypersomnia with sleep apnea, unspecified; Obsessive-compulsive disorders; Anxiety state, unspecified; Upper arm pain, right; Hyperlipidemia LDL goal <100; Microscopic hematuria; Urinary  urgency; Myalgia and myositis; Hyperlipidemia with target LDL less than 100; Paresthesias; LBP (low back pain)      fluticasone-salmeterol (ADVAIR DISKUS) 250-50 MCG/DOSE diskus inhaler Inhale 1 puff into the lungs every 12 hours as needed  Qty: 3 Inhaler, Refills: 1    Associated Diagnoses: Moderate persistent asthma; Routine general medical examination at a health care facility; Unspecified vitamin D deficiency; Other diseases of nasal cavity and sinuses(478.19); Unspecified constipation; Urinary frequency; Irritable bowel syndrome; Hypersomnia with sleep apnea, unspecified; Obsessive-compulsive disorders; Anxiety state, unspecified; Upper arm pain, right; Hyperlipidemia LDL goal <100; Microscopic hematuria; Urinary urgency; Myalgia and myositis; Hyperlipidemia with target LDL less than 100; Paresthesias; LBP (low back pain)      lovastatin (MEVACOR) 40 MG tablet Take 1 tablet (40 mg) by mouth daily  Qty: 90 tablet, Refills: 3    Associated Diagnoses: Hyperlipidemia LDL goal <100      MULTI-VITAMIN OR TABS 1 TABLET DAILY      Omega-3 Fatty Acids (EPA PO) Take 1 teaspoonful by mouth daily    Associated Diagnoses: Routine general medical examination at a health care facility; Unspecified vitamin D deficiency; Moderate persistent asthma; Other diseases of nasal cavity and sinuses(478.19); Unspecified constipation; Urinary frequency; Irritable bowel syndrome; Hypersomnia with sleep apnea, unspecified; Obsessive-compulsive disorders; Anxiety state, unspecified; Upper arm pain, right; Hyperlipidemia LDL goal <100; Microscopic hematuria; Urinary urgency; Myalgia and myositis; Hyperlipidemia with target LDL less than 100; Paresthesias; LBP (low back pain)      omeprazole (PRILOSEC) 40 MG capsule Take 1 capsule (40 mg) by mouth daily Take 30-60 minutes before a meal.  Qty: 90 capsule, Refills: 3    Associated Diagnoses: GERD (gastroesophageal reflux disease)           Allergies   Allergies   Allergen Reactions      Alatrofloxacin Mesylate Injection      Aspirin      Cefzil      Erythromycin      Allergic all mycins     Nsaids      Sulfa Drugs      Levaquin [Levofloxacin] Rash       DATA    Most Recent 3 CBC's:Recent Labs   Lab Test 05/08/22  2156 08/09/15  0300 10/09/14  1024   WBC 5.1 14.7* 9.7   HGB 12.1 12.8 13.5   MCV 94 95 103*    263 263      Most Recent 3 BMP's:  Recent Labs   Lab Test 05/09/22  1013 05/08/22  2156 08/09/15  0300 10/09/14  1024    132* 139 143   POTASSIUM  --  3.8 4.0 3.9   CHLORIDE  --  99 107 104   CO2  --  26 26 30   BUN  --  29 29 21   CR  --  0.90 1.08* 1.00   ANIONGAP  --  7 6 9.2   DENI  --  8.5 8.3* 9.2   GLC  --  82 122* 90     Most Recent 2 LFT's:  Recent Labs   Lab Test 05/08/22  2156 08/09/15  0300   AST 30 16   ALT 27 21   ALKPHOS 52 64   BILITOTAL 0.5 0.5

## 2022-05-09 NOTE — PROGRESS NOTES
[facility]  Hospitalist Progress Note   05/09/2022          Assessment and Plan:       Ms. Fry is a 77-year-old female with asthma, dyslipidemia, osteopenia admitted with chest tightness and COVID-19 positive.     COVID-19 infection.   Tested positive on home test.  Grandchild recently tested positive for COVID.    Running nose.  Nonproductive cough present.  Loose stools 2 days prior to admission.   No hypoxia.   Chest x-ray, CT no evidence of pneumonia.  CT chest no PE.  Supportive treatment.  Aggressive incentive spirometry.  Recommend isolation for 5 days.     Small pericardial effusion.  No chest pain or palpitations.  EKG nonischemic.  Troponin x4 negative.  CRP 13.5, CK1 36.  Echocardiogram with small pericardial effusion.EF 60 to 65%.  Trace aortic regurgitation.  No hemodynamically significant valvular aortic stenosis.  Cardiology followed, did not recommend any colchicine therapy or additional work-up at this time.  Recommend repeat echocardiogram in 6 to 8 weeks.      Incidental lucency along the medial chest wall:  CXR Lucency along the medial chest favored to be artifactual or related to air within the esophagus.   CT ches lungs and pleura: No infiltrate or pleural effusion. Mild basilar fibroatelectasis.  Afebrile.  No leukocytosis.  Follow-up repeat imaging in 6 to 8 weeks.  Aggressive incentive spirometry    Chronic sinusitis.  Asthma.  Continue PTA as needed inhaler therapy.    Dyslipidemia:   Continue PTA statin therapy.    GERD.  Continue PTA PPI.      Orders Placed This Encounter      Regular Diet Adult      DVT Prophylaxis: SCDs, ambulate  Code Status: Full Code  Disposition: Expected discharge today.    Discussed with patient, bedside RN-multiple times through the day.  More than 60% of time spent in direct patient care, care coordination, patient/caregiver counseling, and formalizing plan of care.     Tera Sainz MD        Interval History:      This morning during  rounds patient ambulating to the bathroom.   No shortness of breath noted.  Complaining of cough.    Again able to discuss with patient this afternoon over the telephone [patient's  Jose Armando on the other line ] Multiple questions regarding lab studies, imaging results, masking and isolation precautions for COVID, COVID-19 treatment, inhaler therapy, zinc and vitamin D supplements. Patient concerned about going home today, observation versus inpatient stay.    Continues to have nonproductive cough.  Oxygen saturation in mid 90s on room air.  Afebrile.  Again able to meet patient in person this evening.         Physical Exam:        Physical Exam   Temp:  [97.3  F (36.3  C)-99.4  F (37.4  C)] 99  F (37.2  C)  Pulse:  [71-80] 74  Resp:  [16-22] 18  BP: (119-154)/(60-88) 130/68  SpO2:  [94 %-99 %] 94 %6    Admission Weight: 65.3 kg (143 lb 15.4 oz)  Current Weight: 65.3 kg (143 lb 15.4 oz)    PHYSICAL EXAM  GENERAL: Patient is in no distress. Alert and oriented.  HEENT: Oropharynx pink  LUNGS: Bilateral clear breath sounds.  No wheezing no crackles.  Respirations unlabored   CVS S1-S2 heard, no murmurs.  Abdomen soft nontender.  NEURO: Moving all extremities.    EXTREMITIES: No pedal edema.  Warm peripheries.  PSYCHIATRY Cooperative       Medications:          sodium chloride (PF)  3 mL Intracatheter Q8H     acetaminophen **OR** acetaminophen, bisacodyl, lidocaine 4%, lidocaine (buffered or not buffered), melatonin, nitroGLYcerin, ondansetron **OR** ondansetron, prochlorperazine **OR** prochlorperazine **OR** prochlorperazine, senna-docusate **OR** senna-docusate, sodium chloride (PF)         Data:      All new lab and imaging data was reviewed.

## 2022-05-09 NOTE — H&P
[facility]    History and Physical - Hospitalist Service       Date of Admission:  5/8/2022  Dictation #: [id number]  Brief Summary (see dictation for more details): [id number] hx Asthma presents with chest tightness and COVID + antigen home test.  No hypoxia, pneumonia or major respiratory symptoms so supportive treatment at this point.  CT did show small pericardial effusion so plan TTE and cards consult in the am. Possible discharge to home later Monday pending cardiac work up.     Clinically Significant Risk Factors Present on Admission         # Hyponatremia: Na = 132 mmol/L (Ref range: 133 - 144 mmol/L) on admission, will monitor as appropriate                Felix Lira, DO  Hospitalist Service  [facility]  Securely message with the Vocera Web Console (learn more here)  Text page via [employer] Paging/Directory

## 2022-05-14 LAB
BACTERIA BLD CULT: NO GROWTH
BACTERIA BLD CULT: NO GROWTH

## 2024-02-12 ENCOUNTER — TRANSCRIBE ORDERS (OUTPATIENT)
Dept: OTHER | Age: 79
End: 2024-02-12

## 2024-02-12 DIAGNOSIS — N32.81 OAB (OVERACTIVE BLADDER): Primary | ICD-10-CM

## 2025-03-23 ENCOUNTER — APPOINTMENT (OUTPATIENT)
Dept: CT IMAGING | Facility: CLINIC | Age: 80
DRG: 871 | End: 2025-03-23
Attending: EMERGENCY MEDICINE
Payer: COMMERCIAL

## 2025-03-23 ENCOUNTER — HOSPITAL ENCOUNTER (INPATIENT)
Facility: CLINIC | Age: 80
DRG: 871 | End: 2025-03-23
Attending: EMERGENCY MEDICINE | Admitting: INTERNAL MEDICINE
Payer: COMMERCIAL

## 2025-03-23 DIAGNOSIS — J18.9 MULTIFOCAL PNEUMONIA: Primary | ICD-10-CM

## 2025-03-23 DIAGNOSIS — R05.9 COUGH, UNSPECIFIED TYPE: ICD-10-CM

## 2025-03-23 DIAGNOSIS — S22.009A CLOSED FRACTURE OF THORACIC VERTEBRAL BODY (H): ICD-10-CM

## 2025-03-23 DIAGNOSIS — U07.1 INFECTION DUE TO 2019 NOVEL CORONAVIRUS: ICD-10-CM

## 2025-03-23 DIAGNOSIS — M62.81 GENERALIZED MUSCLE WEAKNESS: ICD-10-CM

## 2025-03-23 DIAGNOSIS — R29.6 FALLS FREQUENTLY: ICD-10-CM

## 2025-03-23 DIAGNOSIS — M54.50 ACUTE BILATERAL LOW BACK PAIN WITHOUT SCIATICA: ICD-10-CM

## 2025-03-23 DIAGNOSIS — A41.9 SEPSIS WITHOUT ACUTE ORGAN DYSFUNCTION, DUE TO UNSPECIFIED ORGANISM (H): ICD-10-CM

## 2025-03-23 LAB
ALBUMIN SERPL BCG-MCNC: 3.7 G/DL (ref 3.5–5.2)
ALBUMIN UR-MCNC: 100 MG/DL
ALP SERPL-CCNC: 101 U/L (ref 40–150)
ALT SERPL W P-5'-P-CCNC: 29 U/L (ref 0–50)
ANION GAP SERPL CALCULATED.3IONS-SCNC: 11 MMOL/L (ref 7–15)
APPEARANCE UR: CLEAR
AST SERPL W P-5'-P-CCNC: 36 U/L (ref 0–45)
BASOPHILS # BLD AUTO: 0.1 10E3/UL (ref 0–0.2)
BASOPHILS NFR BLD AUTO: 0 %
BILIRUB SERPL-MCNC: 0.5 MG/DL
BILIRUB UR QL STRIP: NEGATIVE
BUN SERPL-MCNC: 34.3 MG/DL (ref 8–23)
CALCIUM SERPL-MCNC: 9.4 MG/DL (ref 8.8–10.4)
CHLORIDE SERPL-SCNC: 90 MMOL/L (ref 98–107)
COLOR UR AUTO: YELLOW
CREAT SERPL-MCNC: 1.18 MG/DL (ref 0.51–0.95)
EGFRCR SERPLBLD CKD-EPI 2021: 46 ML/MIN/1.73M2
EOSINOPHIL # BLD AUTO: 0 10E3/UL (ref 0–0.7)
EOSINOPHIL NFR BLD AUTO: 0 %
ERYTHROCYTE [DISTWIDTH] IN BLOOD BY AUTOMATED COUNT: 13.7 % (ref 10–15)
FLUAV RNA SPEC QL NAA+PROBE: NEGATIVE
FLUBV RNA RESP QL NAA+PROBE: NEGATIVE
GLUCOSE SERPL-MCNC: 180 MG/DL (ref 70–99)
GLUCOSE UR STRIP-MCNC: NEGATIVE MG/DL
HCO3 SERPL-SCNC: 26 MMOL/L (ref 22–29)
HCT VFR BLD AUTO: 35.2 % (ref 35–47)
HGB BLD-MCNC: 12 G/DL (ref 11.7–15.7)
HGB UR QL STRIP: ABNORMAL
HYALINE CASTS: 1 /LPF
IMM GRANULOCYTES # BLD: 0.3 10E3/UL
IMM GRANULOCYTES NFR BLD: 1 %
KETONES UR STRIP-MCNC: NEGATIVE MG/DL
LACTATE SERPL-SCNC: 0.6 MMOL/L (ref 0.7–2)
LEUKOCYTE ESTERASE UR QL STRIP: NEGATIVE
LYMPHOCYTES # BLD AUTO: 0.5 10E3/UL (ref 0.8–5.3)
LYMPHOCYTES NFR BLD AUTO: 2 %
MCH RBC QN AUTO: 32.1 PG (ref 26.5–33)
MCHC RBC AUTO-ENTMCNC: 34.1 G/DL (ref 31.5–36.5)
MCV RBC AUTO: 94 FL (ref 78–100)
MONOCYTES # BLD AUTO: 1.7 10E3/UL (ref 0–1.3)
MONOCYTES NFR BLD AUTO: 6 %
MUCOUS THREADS #/AREA URNS LPF: PRESENT /LPF
NEUTROPHILS # BLD AUTO: 25.6 10E3/UL (ref 1.6–8.3)
NEUTROPHILS NFR BLD AUTO: 91 %
NITRATE UR QL: NEGATIVE
NRBC # BLD AUTO: 0 10E3/UL
NRBC BLD AUTO-RTO: 0 /100
PH UR STRIP: 6 [PH] (ref 5–7)
PLAT MORPH BLD: NORMAL
PLATELET # BLD AUTO: 273 10E3/UL (ref 150–450)
POTASSIUM SERPL-SCNC: 4.4 MMOL/L (ref 3.4–5.3)
PROT SERPL-MCNC: 7.5 G/DL (ref 6.4–8.3)
RBC # BLD AUTO: 3.74 10E6/UL (ref 3.8–5.2)
RBC MORPH BLD: NORMAL
RBC URINE: 3 /HPF
RSV RNA SPEC NAA+PROBE: NEGATIVE
SARS-COV-2 RNA RESP QL NAA+PROBE: NEGATIVE
SODIUM SERPL-SCNC: 127 MMOL/L (ref 135–145)
SP GR UR STRIP: 1.01 (ref 1–1.03)
SQUAMOUS EPITHELIAL: 1 /HPF
UROBILINOGEN UR STRIP-MCNC: NORMAL MG/DL
WBC # BLD AUTO: 28.2 10E3/UL (ref 4–11)
WBC URINE: 3 /HPF

## 2025-03-23 PROCEDURE — 71260 CT THORAX DX C+: CPT

## 2025-03-23 PROCEDURE — 250N000009 HC RX 250: Performed by: EMERGENCY MEDICINE

## 2025-03-23 PROCEDURE — 250N000011 HC RX IP 250 OP 636: Performed by: EMERGENCY MEDICINE

## 2025-03-23 PROCEDURE — 83605 ASSAY OF LACTIC ACID: CPT | Performed by: EMERGENCY MEDICINE

## 2025-03-23 PROCEDURE — 250N000013 HC RX MED GY IP 250 OP 250 PS 637: Performed by: EMERGENCY MEDICINE

## 2025-03-23 PROCEDURE — 72125 CT NECK SPINE W/O DYE: CPT

## 2025-03-23 PROCEDURE — 120N000001 HC R&B MED SURG/OB

## 2025-03-23 PROCEDURE — 96374 THER/PROPH/DIAG INJ IV PUSH: CPT | Mod: 59

## 2025-03-23 PROCEDURE — 72128 CT CHEST SPINE W/O DYE: CPT

## 2025-03-23 PROCEDURE — 70450 CT HEAD/BRAIN W/O DYE: CPT

## 2025-03-23 PROCEDURE — 87040 BLOOD CULTURE FOR BACTERIA: CPT | Performed by: EMERGENCY MEDICINE

## 2025-03-23 PROCEDURE — 93005 ELECTROCARDIOGRAM TRACING: CPT

## 2025-03-23 PROCEDURE — 99285 EMERGENCY DEPT VISIT HI MDM: CPT | Mod: 25

## 2025-03-23 PROCEDURE — 87637 SARSCOV2&INF A&B&RSV AMP PRB: CPT | Performed by: EMERGENCY MEDICINE

## 2025-03-23 PROCEDURE — 72131 CT LUMBAR SPINE W/O DYE: CPT

## 2025-03-23 PROCEDURE — 81001 URINALYSIS AUTO W/SCOPE: CPT | Performed by: EMERGENCY MEDICINE

## 2025-03-23 PROCEDURE — 99223 1ST HOSP IP/OBS HIGH 75: CPT | Performed by: INTERNAL MEDICINE

## 2025-03-23 PROCEDURE — 85025 COMPLETE CBC W/AUTO DIFF WBC: CPT | Performed by: EMERGENCY MEDICINE

## 2025-03-23 PROCEDURE — 80053 COMPREHEN METABOLIC PANEL: CPT | Performed by: EMERGENCY MEDICINE

## 2025-03-23 PROCEDURE — 36415 COLL VENOUS BLD VENIPUNCTURE: CPT | Performed by: EMERGENCY MEDICINE

## 2025-03-23 RX ORDER — IOPAMIDOL 755 MG/ML
62 INJECTION, SOLUTION INTRAVASCULAR ONCE
Status: COMPLETED | OUTPATIENT
Start: 2025-03-23 | End: 2025-03-23

## 2025-03-23 RX ORDER — CEFTRIAXONE 2 G/1
2 INJECTION, POWDER, FOR SOLUTION INTRAMUSCULAR; INTRAVENOUS ONCE
Status: COMPLETED | OUTPATIENT
Start: 2025-03-23 | End: 2025-03-23

## 2025-03-23 RX ORDER — FLUTICASONE PROPIONATE AND SALMETEROL 250; 50 UG/1; UG/1
1 POWDER RESPIRATORY (INHALATION) EVERY 12 HOURS
COMMUNITY

## 2025-03-23 RX ORDER — DOXYCYCLINE 100 MG/1
100 CAPSULE ORAL ONCE
Status: COMPLETED | OUTPATIENT
Start: 2025-03-23 | End: 2025-03-23

## 2025-03-23 RX ORDER — FLUTICASONE PROPIONATE 50 MCG
1-2 SPRAY, SUSPENSION (ML) NASAL DAILY PRN
COMMUNITY

## 2025-03-23 RX ADMIN — IOPAMIDOL 62 ML: 755 INJECTION, SOLUTION INTRAVENOUS at 18:44

## 2025-03-23 RX ADMIN — DOXYCYCLINE HYCLATE 100 MG: 100 CAPSULE ORAL at 21:10

## 2025-03-23 RX ADMIN — SODIUM CHLORIDE 60 ML: 9 INJECTION, SOLUTION INTRAVENOUS at 18:45

## 2025-03-23 RX ADMIN — CEFTRIAXONE SODIUM 2 G: 2 INJECTION, POWDER, FOR SOLUTION INTRAMUSCULAR; INTRAVENOUS at 21:10

## 2025-03-23 ASSESSMENT — ACTIVITIES OF DAILY LIVING (ADL)
ADLS_ACUITY_SCORE: 41

## 2025-03-23 ASSESSMENT — COLUMBIA-SUICIDE SEVERITY RATING SCALE - C-SSRS
6. HAVE YOU EVER DONE ANYTHING, STARTED TO DO ANYTHING, OR PREPARED TO DO ANYTHING TO END YOUR LIFE?: NO
1. IN THE PAST MONTH, HAVE YOU WISHED YOU WERE DEAD OR WISHED YOU COULD GO TO SLEEP AND NOT WAKE UP?: NO
2. HAVE YOU ACTUALLY HAD ANY THOUGHTS OF KILLING YOURSELF IN THE PAST MONTH?: NO

## 2025-03-23 NOTE — ED PROVIDER NOTES
Emergency Department Note      History of Present Illness     Chief Complaint   Generalized Weakness      HPI   Darleen Fry is a 80 year old female presenting with  for evaluation of generalized weakness. The patient reports that she woke up 3 days ago at about 0415 and felt chills, shakiness, and generalized weakness.  Patient reports that she had been having intermittent URI symptoms since February.  Patient has had a chronic cough since then and runny nose. She fell out of bed and hit her head and right leg 3 days ago without loss of consciousness and mild associated bruising to the leg. She has associated back pain and notes mild bruising to her chest. She denies fever or leg swelling. She is not currently taking any antibiotics.  Patient's  reports that she has fallen twice in the last 3 days.    Independent Historian   None    Review of External Notes   N/A    Past Medical History     Medical History and Problem List   Asthma  Degenerative disk disease  62% lung capacity  Sleep apnea  Closed fracture of left 4th metatarsal  Nasal polyp  Esophageal reflux  High cholesterol  Steroid-induced osteopenia  Bladder leak  Irritable bowel syndrome  Plantar fasciitis  Pericardial effusion  TMJ arthralgia  Arthritis  Osteopenia  Seasonal allergies    Medications   Albuterol  Fluticasone  Fluticasone-salmeterol  Lovastatin  Omeprazole    Surgical History   D&C  HC Nasal surgery  HC tooth extraction w/ forcep  Urethral caruncle    Physical Exam     Patient Vitals for the past 24 hrs:   BP Temp Temp src Pulse Resp SpO2 Weight   03/23/25 2007 -- -- -- -- -- 94 % --   03/23/25 2002 (!) 149/71 -- -- 99 -- 95 % --   03/23/25 1643 (!) 148/71 98.9  F (37.2  C) Oral 98 16 96 % 55.8 kg (123 lb)     Physical Exam  General: Alert and cooperative with exam. Patient in mild distress. Normal mentation.  Nontoxic appearance  Head:  Scalp is NC/AT  Eyes:  No scleral icterus, PERRL  ENT:  The external nose and ears are  normal. The oropharynx is normal and without erythema; mucus membranes are moist. Uvula midline, no evidence of deep space infection.  Neck:  Normal range of motion without rigidity.  CV:  Regular rate and rhythm    No pathologic murmur   Resp:  Diminished lung sounds in right lung base with few diffuse crackles    Non-labored, no retractions or accessory muscle use  GI:  Abdomen is soft, no distension, no tenderness. No peritoneal signs  MS:  No lower extremity edema.  Few superficial bruises to the anterior right shin and sternum  Skin:  Warm and dry, No rash or lesions noted.  Neuro: Oriented x 3. No gross motor deficits.     Diagnostics     Lab Results   Labs Ordered and Resulted from Time of ED Arrival to Time of ED Departure   COMPREHENSIVE METABOLIC PANEL - Abnormal       Result Value    Sodium 127 (*)     Potassium 4.4      Carbon Dioxide (CO2) 26      Anion Gap 11      Urea Nitrogen 34.3 (*)     Creatinine 1.18 (*)     GFR Estimate 46 (*)     Calcium 9.4      Chloride 90 (*)     Glucose 180 (*)     Alkaline Phosphatase 101      AST 36      ALT 29      Protein Total 7.5      Albumin 3.7      Bilirubin Total 0.5     ROUTINE UA WITH MICROSCOPIC - Abnormal    Color Urine Yellow      Appearance Urine Clear      Glucose Urine Negative      Bilirubin Urine Negative      Ketones Urine Negative      Specific Gravity Urine 1.010      Blood Urine Small (*)     pH Urine 6.0      Protein Albumin Urine 100 (*)     Urobilinogen Urine Normal      Nitrite Urine Negative      Leukocyte Esterase Urine Negative      Mucus Urine Present (*)     RBC Urine 3 (*)     WBC Urine 3      Squamous Epithelials Urine 1      Hyaline Casts Urine 1     CBC WITH PLATELETS AND DIFFERENTIAL - Abnormal    WBC Count 28.2 (*)     RBC Count 3.74 (*)     Hemoglobin 12.0      Hematocrit 35.2      MCV 94      MCH 32.1      MCHC 34.1      RDW 13.7      Platelet Count 273      % Neutrophils 91      % Lymphocytes 2      % Monocytes 6      % Eosinophils  0      % Basophils 0      % Immature Granulocytes 1      NRBCs per 100 WBC 0      Absolute Neutrophils 25.6 (*)     Absolute Lymphocytes 0.5 (*)     Absolute Monocytes 1.7 (*)     Absolute Eosinophils 0.0      Absolute Basophils 0.1      Absolute Immature Granulocytes 0.3      Absolute NRBCs 0.0     LACTIC ACID WHOLE BLOOD WITH 1X REPEAT IN 2 HR WHEN >2 - Abnormal    Lactic Acid, Initial 0.6 (*)    INFLUENZA A/B, RSV AND SARS-COV2 PCR - Normal    Influenza A PCR Negative      Influenza B PCR Negative      RSV PCR Negative      SARS CoV2 PCR Negative     RBC AND PLATELET MORPHOLOGY    RBC Morphology Confirmed RBC Indices      Platelet Assessment        Value: Automated Count Confirmed. Platelet morphology is normal.   BLOOD CULTURE   BLOOD CULTURE       Imaging   CT Chest w Contrast   Final Result   IMPRESSION:    1.   CT findings compatible with multifocal pneumonia, greatest in the right middle lobe. Clinical correlation and follow-up to resolution recommended.   2.  Associated small bilateral pleural effusions, right greater than left.   3.  Likely reactive mediastinal adenopathy.      CT Thoracic Spine w/o Contrast   Final Result    IMPRESSION:   1.  T9 vertebral body superior endplate osteophyte age-indeterminate fracture.      2.  Otherwise, no acute fracture within the thoracic spine.        3.  Chronic spine changes described above.         CT Lumbar Spine w/o Contrast   Final Result    IMPRESSION:   1.  No acute fracture.      2.  Chronic spine changes described above.      3.  Abdominal aortic aneurysm measuring 2.6 cm TR dimension.      CT Cervical Spine w/o Contrast   Final Result    IMPRESSION:   1.  No acute fracture within the cervical spine.      Head CT w/o contrast   Final Result   IMPRESSION:   1.  No acute intracranial process.      2.  Age-related changes described above.      3.  Left sphenoid sinus air-fluid level. Please correlate for acute sinusitis.          EKG     ECG results from 03/23/25    EKG 12 lead     Value    Systolic Blood Pressure     Diastolic Blood Pressure     Ventricular Rate 98    Atrial Rate 98    TN Interval 140    QRS Duration 112        QTc 423    P Axis 45    R AXIS 50    T Axis 40    Interpretation ECG      Sinus rhythm  Right bundle branch block  Abnormal ECG  When compared with ECG of 08-May-2022 18:15,  Right bundle branch block is now Present     EKG 12 lead     Value    Systolic Blood Pressure     Diastolic Blood Pressure     Ventricular Rate 99    Atrial Rate 99    TN Interval 132    QRS Duration 118        QTc 415    P Axis 76    R AXIS 63    T Axis 73    Interpretation ECG      Sinus rhythm with Premature atrial complexes  Right bundle branch block  Abnormal ECG  When compared with ECG of 23-Mar-2025 16:45, (unconfirmed)  Premature atrial complexes are now Present  No significant change from previous         Independent Interpretation   CT Head: No intracranial hemorrhage.    ED Course      Medications Administered   Medications   iopamidol (ISOVUE-370) solution 62 mL (62 mLs Intravenous $Given 3/23/25 1844)   sodium chloride 0.9 % CT scan flush use (60 mLs Intravenous $Given 3/23/25 1845)   cefTRIAXone (ROCEPHIN) 2 g vial to attach to  ml bag for ADULTS or NS 50 ml bag for PEDS (2 g Intravenous $New Bag 3/23/25 2110)   doxycycline hyclate (VIBRAMYCIN) capsule 100 mg (100 mg Oral $Given 3/23/25 2110)       Procedures   Procedures     Discussion of Management   Admitting Hospitalist, Dr. Lopez    ED Course   ED Course as of 03/23/25 2205   Sun Mar 23, 2025   2027 I rechecked the patient and updated her and her  on the care plan.   2116 I spoke to Dr. Lopez regarding the patient.       Additional Documentation  None    Medical Decision Making / Diagnosis     CMS Diagnoses: None    MIPS       None    [facility]   Darleen Fry is a 80 year old female who presents with generalized weakness and chronic cough as well as recent multiple falls and associated  back pain.  Patient's medical history and records reviewed.  On evaluation she is nontoxic in appearance and hemodynamically stable.  Workup was initiated including labs, EKG, and imaging.  Patient was noted to have mild diffuse back pain and chest wall pain.  CT imaging of the thoracic, cervical, and lumbar spine is most notable for T9 vertebral body superior endplate osteophyte age-indeterminate fracture; see complete results above.  Patient does report hitting her head though has no focal neurologic deficits or evidence of clinically significant concussion.  Head CT without evidence of intracranial bleed.  Labs were notable for significantly white count of (28.2), normal lactic acid oh (0.6), UA without evidence of infection, negative COVID/influenza/RSV testing, mild hyponatremia (sodium 127), and mildly elevated creatinine (1.18).  CT imaging of the chest demonstrates multifocal pneumonia greatest in the right middle lobe as well as associated small bilateral pleural effusions and reactive mediastinal adenopathy.  Initiated on ceftriaxone and doxycycline after blood cultures obtained.  Given patient's lab abnormalities, significant generalized weakness, and multifocal pneumonia, will admit to the hospital service for further evaluation and care.  She remained hemodynamically stable throughout my care.  Is not requiring supplemental oxygen at this time.    Disposition   The patient was admitted to the hospital.     Diagnosis     ICD-10-CM    1. Multifocal pneumonia  J18.9       2. Sepsis without acute organ dysfunction, due to unspecified organism (H)  A41.9       3. Falls frequently  R29.6       4. Closed fracture of thoracic vertebral body (H)  S22.009A                  Scribe Disclosure:  Carol Ann FELTON, am serving as a scribe at 5:10 PM on 3/23/2025 to document services personally performed by Matthew Macias DO based on my observations and the provider's statements to me.        Gilberto  Matthew Gilliland,   03/23/25 0622

## 2025-03-23 NOTE — ED TRIAGE NOTES
Pt reports waking up feeling shaky, sweaty and weak Thursday night resulting in fall out of bed. +head strike. Denies loc. Pt was unable to get up herself and had  pick her up. Pt continues to have increasing weakness since. Denies fevers. Reports cough x 3 months     Triage Assessment (Adult)       Row Name 03/23/25 1640          Respiratory WDL    Respiratory WDL WDL        Cardiac WDL    Cardiac WDL WDL        Cognitive/Neuro/Behavioral WDL    Cognitive/Neuro/Behavioral WDL WDL

## 2025-03-24 LAB
ANION GAP SERPL CALCULATED.3IONS-SCNC: 13 MMOL/L (ref 7–15)
ATRIAL RATE - MUSE: 98 BPM
ATRIAL RATE - MUSE: 99 BPM
BUN SERPL-MCNC: 24.4 MG/DL (ref 8–23)
CALCIUM SERPL-MCNC: 9.1 MG/DL (ref 8.8–10.4)
CHLORIDE SERPL-SCNC: 92 MMOL/L (ref 98–107)
CREAT SERPL-MCNC: 0.93 MG/DL (ref 0.51–0.95)
DIASTOLIC BLOOD PRESSURE - MUSE: NORMAL MMHG
DIASTOLIC BLOOD PRESSURE - MUSE: NORMAL MMHG
EGFRCR SERPLBLD CKD-EPI 2021: 61 ML/MIN/1.73M2
ERYTHROCYTE [DISTWIDTH] IN BLOOD BY AUTOMATED COUNT: 13.9 % (ref 10–15)
GLUCOSE SERPL-MCNC: 126 MG/DL (ref 70–99)
HCO3 SERPL-SCNC: 25 MMOL/L (ref 22–29)
HCT VFR BLD AUTO: 33.1 % (ref 35–47)
HGB BLD-MCNC: 11.2 G/DL (ref 11.7–15.7)
INTERPRETATION ECG - MUSE: NORMAL
INTERPRETATION ECG - MUSE: NORMAL
MCH RBC QN AUTO: 31.9 PG (ref 26.5–33)
MCHC RBC AUTO-ENTMCNC: 33.8 G/DL (ref 31.5–36.5)
MCV RBC AUTO: 94 FL (ref 78–100)
P AXIS - MUSE: 45 DEGREES
P AXIS - MUSE: 76 DEGREES
PLATELET # BLD AUTO: 281 10E3/UL (ref 150–450)
POTASSIUM SERPL-SCNC: 3.8 MMOL/L (ref 3.4–5.3)
PR INTERVAL - MUSE: 132 MS
PR INTERVAL - MUSE: 140 MS
QRS DURATION - MUSE: 112 MS
QRS DURATION - MUSE: 118 MS
QT - MUSE: 324 MS
QT - MUSE: 332 MS
QTC - MUSE: 415 MS
QTC - MUSE: 423 MS
R AXIS - MUSE: 50 DEGREES
R AXIS - MUSE: 63 DEGREES
RBC # BLD AUTO: 3.51 10E6/UL (ref 3.8–5.2)
SODIUM SERPL-SCNC: 130 MMOL/L (ref 135–145)
SYSTOLIC BLOOD PRESSURE - MUSE: NORMAL MMHG
SYSTOLIC BLOOD PRESSURE - MUSE: NORMAL MMHG
T AXIS - MUSE: 40 DEGREES
T AXIS - MUSE: 73 DEGREES
VENTRICULAR RATE- MUSE: 98 BPM
VENTRICULAR RATE- MUSE: 99 BPM
WBC # BLD AUTO: 28.1 10E3/UL (ref 4–11)

## 2025-03-24 PROCEDURE — 36415 COLL VENOUS BLD VENIPUNCTURE: CPT | Performed by: INTERNAL MEDICINE

## 2025-03-24 PROCEDURE — 250N000013 HC RX MED GY IP 250 OP 250 PS 637: Performed by: HOSPITALIST

## 2025-03-24 PROCEDURE — 258N000003 HC RX IP 258 OP 636: Performed by: INTERNAL MEDICINE

## 2025-03-24 PROCEDURE — 85027 COMPLETE CBC AUTOMATED: CPT | Performed by: INTERNAL MEDICINE

## 2025-03-24 PROCEDURE — 99232 SBSQ HOSP IP/OBS MODERATE 35: CPT | Performed by: HOSPITALIST

## 2025-03-24 PROCEDURE — 99221 1ST HOSP IP/OBS SF/LOW 40: CPT

## 2025-03-24 PROCEDURE — 120N000001 HC R&B MED SURG/OB

## 2025-03-24 PROCEDURE — 84443 ASSAY THYROID STIM HORMONE: CPT | Performed by: HOSPITALIST

## 2025-03-24 PROCEDURE — 250N000013 HC RX MED GY IP 250 OP 250 PS 637: Performed by: INTERNAL MEDICINE

## 2025-03-24 PROCEDURE — 250N000011 HC RX IP 250 OP 636: Performed by: INTERNAL MEDICINE

## 2025-03-24 PROCEDURE — 80048 BASIC METABOLIC PNL TOTAL CA: CPT | Performed by: INTERNAL MEDICINE

## 2025-03-24 RX ORDER — HYDRALAZINE HYDROCHLORIDE 10 MG/1
10 TABLET, FILM COATED ORAL EVERY 4 HOURS PRN
Status: DISCONTINUED | OUTPATIENT
Start: 2025-03-24 | End: 2025-03-28 | Stop reason: HOSPADM

## 2025-03-24 RX ORDER — ALBUTEROL SULFATE 90 UG/1
2 INHALANT RESPIRATORY (INHALATION) EVERY 6 HOURS PRN
Status: DISCONTINUED | OUTPATIENT
Start: 2025-03-24 | End: 2025-03-28 | Stop reason: HOSPADM

## 2025-03-24 RX ORDER — CEFTRIAXONE 2 G/1
2 INJECTION, POWDER, FOR SOLUTION INTRAMUSCULAR; INTRAVENOUS EVERY 24 HOURS
Status: DISCONTINUED | OUTPATIENT
Start: 2025-03-24 | End: 2025-03-28

## 2025-03-24 RX ORDER — GUAIFENESIN AND DEXTROMETHORPHAN HYDROBROMIDE 600; 30 MG/1; MG/1
1 TABLET, EXTENDED RELEASE ORAL 2 TIMES DAILY
Status: DISCONTINUED | OUTPATIENT
Start: 2025-03-24 | End: 2025-03-27

## 2025-03-24 RX ORDER — ATORVASTATIN CALCIUM 10 MG/1
10 TABLET, FILM COATED ORAL DAILY
Status: DISCONTINUED | OUTPATIENT
Start: 2025-03-24 | End: 2025-03-28 | Stop reason: HOSPADM

## 2025-03-24 RX ORDER — AMOXICILLIN 250 MG
1 CAPSULE ORAL 2 TIMES DAILY PRN
Status: DISCONTINUED | OUTPATIENT
Start: 2025-03-24 | End: 2025-03-28 | Stop reason: HOSPADM

## 2025-03-24 RX ORDER — NALOXONE HYDROCHLORIDE 0.4 MG/ML
0.2 INJECTION, SOLUTION INTRAMUSCULAR; INTRAVENOUS; SUBCUTANEOUS
Status: DISCONTINUED | OUTPATIENT
Start: 2025-03-24 | End: 2025-03-28 | Stop reason: HOSPADM

## 2025-03-24 RX ORDER — POLYETHYLENE GLYCOL 3350 17 G/17G
17 POWDER, FOR SOLUTION ORAL 2 TIMES DAILY PRN
Status: DISCONTINUED | OUTPATIENT
Start: 2025-03-24 | End: 2025-03-28 | Stop reason: HOSPADM

## 2025-03-24 RX ORDER — FLUTICASONE PROPIONATE 50 MCG
1-2 SPRAY, SUSPENSION (ML) NASAL DAILY PRN
Status: DISCONTINUED | OUTPATIENT
Start: 2025-03-24 | End: 2025-03-28 | Stop reason: HOSPADM

## 2025-03-24 RX ORDER — ENOXAPARIN SODIUM 100 MG/ML
40 INJECTION SUBCUTANEOUS EVERY 24 HOURS
Status: DISCONTINUED | OUTPATIENT
Start: 2025-03-24 | End: 2025-03-28 | Stop reason: HOSPADM

## 2025-03-24 RX ORDER — HYDRALAZINE HYDROCHLORIDE 20 MG/ML
10 INJECTION INTRAMUSCULAR; INTRAVENOUS EVERY 4 HOURS PRN
Status: DISCONTINUED | OUTPATIENT
Start: 2025-03-24 | End: 2025-03-28 | Stop reason: HOSPADM

## 2025-03-24 RX ORDER — OXYCODONE HYDROCHLORIDE 5 MG/1
5 TABLET ORAL EVERY 4 HOURS PRN
Status: DISCONTINUED | OUTPATIENT
Start: 2025-03-24 | End: 2025-03-28 | Stop reason: HOSPADM

## 2025-03-24 RX ORDER — PROCHLORPERAZINE MALEATE 5 MG/1
5 TABLET ORAL EVERY 6 HOURS PRN
Status: DISCONTINUED | OUTPATIENT
Start: 2025-03-24 | End: 2025-03-28 | Stop reason: HOSPADM

## 2025-03-24 RX ORDER — ONDANSETRON 4 MG/1
4 TABLET, ORALLY DISINTEGRATING ORAL EVERY 6 HOURS PRN
Status: DISCONTINUED | OUTPATIENT
Start: 2025-03-24 | End: 2025-03-28 | Stop reason: HOSPADM

## 2025-03-24 RX ORDER — SODIUM CHLORIDE, SODIUM LACTATE, POTASSIUM CHLORIDE, CALCIUM CHLORIDE 600; 310; 30; 20 MG/100ML; MG/100ML; MG/100ML; MG/100ML
INJECTION, SOLUTION INTRAVENOUS CONTINUOUS
Status: DISCONTINUED | OUTPATIENT
Start: 2025-03-24 | End: 2025-03-26

## 2025-03-24 RX ORDER — NALOXONE HYDROCHLORIDE 0.4 MG/ML
0.4 INJECTION, SOLUTION INTRAMUSCULAR; INTRAVENOUS; SUBCUTANEOUS
Status: DISCONTINUED | OUTPATIENT
Start: 2025-03-24 | End: 2025-03-28 | Stop reason: HOSPADM

## 2025-03-24 RX ORDER — ONDANSETRON 2 MG/ML
4 INJECTION INTRAMUSCULAR; INTRAVENOUS EVERY 6 HOURS PRN
Status: DISCONTINUED | OUTPATIENT
Start: 2025-03-24 | End: 2025-03-28 | Stop reason: HOSPADM

## 2025-03-24 RX ORDER — AMOXICILLIN 250 MG
2 CAPSULE ORAL 2 TIMES DAILY PRN
Status: DISCONTINUED | OUTPATIENT
Start: 2025-03-24 | End: 2025-03-28 | Stop reason: HOSPADM

## 2025-03-24 RX ORDER — ACETAMINOPHEN 325 MG/1
975 TABLET ORAL 3 TIMES DAILY
Status: DISCONTINUED | OUTPATIENT
Start: 2025-03-24 | End: 2025-03-28 | Stop reason: HOSPADM

## 2025-03-24 RX ORDER — FLUTICASONE FUROATE AND VILANTEROL 100; 25 UG/1; UG/1
1 POWDER RESPIRATORY (INHALATION) DAILY
Status: DISCONTINUED | OUTPATIENT
Start: 2025-03-24 | End: 2025-03-28 | Stop reason: HOSPADM

## 2025-03-24 RX ORDER — DOXYCYCLINE 100 MG/1
100 CAPSULE ORAL EVERY 12 HOURS SCHEDULED
Status: DISCONTINUED | OUTPATIENT
Start: 2025-03-24 | End: 2025-03-27

## 2025-03-24 RX ORDER — LIDOCAINE 40 MG/G
CREAM TOPICAL
Status: DISCONTINUED | OUTPATIENT
Start: 2025-03-24 | End: 2025-03-28 | Stop reason: HOSPADM

## 2025-03-24 RX ORDER — GUAIFENESIN 200 MG/10ML
200 LIQUID ORAL EVERY 4 HOURS PRN
Status: DISCONTINUED | OUTPATIENT
Start: 2025-03-24 | End: 2025-03-24

## 2025-03-24 RX ORDER — BISACODYL 10 MG
10 SUPPOSITORY, RECTAL RECTAL DAILY PRN
Status: DISCONTINUED | OUTPATIENT
Start: 2025-03-24 | End: 2025-03-28 | Stop reason: HOSPADM

## 2025-03-24 RX ADMIN — ATORVASTATIN CALCIUM 10 MG: 10 TABLET, FILM COATED ORAL at 09:02

## 2025-03-24 RX ADMIN — GUAIFENESIN AND DEXTROMETHORPHAN HYDROBROMIDE 1 TABLET: 600; 30 TABLET, EXTENDED RELEASE ORAL at 21:33

## 2025-03-24 RX ADMIN — CEFTRIAXONE SODIUM 2 G: 2 INJECTION, POWDER, FOR SOLUTION INTRAMUSCULAR; INTRAVENOUS at 22:34

## 2025-03-24 RX ADMIN — ACETAMINOPHEN 975 MG: 325 TABLET, FILM COATED ORAL at 09:02

## 2025-03-24 RX ADMIN — ACETAMINOPHEN 975 MG: 325 TABLET, FILM COATED ORAL at 16:21

## 2025-03-24 RX ADMIN — DOXYCYCLINE HYCLATE 100 MG: 100 CAPSULE ORAL at 09:02

## 2025-03-24 RX ADMIN — GUAIFENESIN AND DEXTROMETHORPHAN HYDROBROMIDE 1 TABLET: 600; 30 TABLET, EXTENDED RELEASE ORAL at 14:26

## 2025-03-24 RX ADMIN — ENOXAPARIN SODIUM 40 MG: 40 INJECTION SUBCUTANEOUS at 09:02

## 2025-03-24 RX ADMIN — DOXYCYCLINE HYCLATE 100 MG: 100 CAPSULE ORAL at 21:33

## 2025-03-24 RX ADMIN — ACETAMINOPHEN 975 MG: 325 TABLET, FILM COATED ORAL at 21:33

## 2025-03-24 RX ADMIN — FLUTICASONE FUROATE AND VILANTEROL TRIFENATATE 1 PUFF: 100; 25 POWDER RESPIRATORY (INHALATION) at 09:04

## 2025-03-24 RX ADMIN — SODIUM CHLORIDE, SODIUM LACTATE, POTASSIUM CHLORIDE, AND CALCIUM CHLORIDE: .6; .31; .03; .02 INJECTION, SOLUTION INTRAVENOUS at 03:33

## 2025-03-24 ASSESSMENT — ACTIVITIES OF DAILY LIVING (ADL)
ADLS_ACUITY_SCORE: 47
ADLS_ACUITY_SCORE: 41
ADLS_ACUITY_SCORE: 73
ADLS_ACUITY_SCORE: 73
ADLS_ACUITY_SCORE: 47
ADLS_ACUITY_SCORE: 73
ADLS_ACUITY_SCORE: 47
ADLS_ACUITY_SCORE: 47
ADLS_ACUITY_SCORE: 73
ADLS_ACUITY_SCORE: 47
ADLS_ACUITY_SCORE: 47
ADLS_ACUITY_SCORE: 72
ADLS_ACUITY_SCORE: 73
ADLS_ACUITY_SCORE: 47
ADLS_ACUITY_SCORE: 47
ADLS_ACUITY_SCORE: 73

## 2025-03-24 NOTE — ED NOTES
[facility]  ED Nurse Handoff Report    ED Chief complaint: Generalized Weakness      ED Diagnosis:   Final diagnoses:   Sepsis without acute organ dysfunction, due to unspecified organism (H)   Multifocal pneumonia   Falls frequently   Closed fracture of thoracic vertebral body (H)       Code Status: Full Code    Allergies:   Allergies   Allergen Reactions    Alatrofloxacin Mesylate Injection     Aspirin     Cefprozil     Erythromycin      Allergic all mycins    Nsaids     Sulfa Antibiotics     Levaquin [Levofloxacin] Rash       Patient Story:   Patient presents from home with increased weakness. Has been feeling ill the last three days with chills, shakiness, and weakness. Has had upper URI symptoms since January. She also had a fall out of bed 3 days prior causing back pain. She was unable to get herself up after the fall and  had to get her up. Normally walks on her own at home. Has difficulty swallowing pills, gave with applesauce in ED. Straight cathed for urine sample, bladder emptied with 300cc. Labs and imaging obtained, medicated per MAR.     Focused Assessment:    Neuro: Alert, oriented x 4  Respiratory:Shortness of breath, on room air  Cardiology: NSR  Gastrointestinal: soft, non tender, non distended   Genitourinary/Renal:  Incontinent baseline  Musculoskeletal: moves all extremities   Skin: Fragile skin  Lines: 20g Left AC    Labs Ordered and Resulted from Time of ED Arrival to Time of ED Departure   COMPREHENSIVE METABOLIC PANEL - Abnormal       Result Value    Sodium 127 (*)     Potassium 4.4      Carbon Dioxide (CO2) 26      Anion Gap 11      Urea Nitrogen 34.3 (*)     Creatinine 1.18 (*)     GFR Estimate 46 (*)     Calcium 9.4      Chloride 90 (*)     Glucose 180 (*)     Alkaline Phosphatase 101      AST 36      ALT 29      Protein Total 7.5      Albumin 3.7      Bilirubin Total 0.5     ROUTINE UA WITH MICROSCOPIC - Abnormal    Color Urine Yellow      Appearance Urine Clear       Glucose Urine Negative      Bilirubin Urine Negative      Ketones Urine Negative      Specific Gravity Urine 1.010      Blood Urine Small (*)     pH Urine 6.0      Protein Albumin Urine 100 (*)     Urobilinogen Urine Normal      Nitrite Urine Negative      Leukocyte Esterase Urine Negative      Mucus Urine Present (*)     RBC Urine 3 (*)     WBC Urine 3      Squamous Epithelials Urine 1      Hyaline Casts Urine 1     CBC WITH PLATELETS AND DIFFERENTIAL - Abnormal    WBC Count 28.2 (*)     RBC Count 3.74 (*)     Hemoglobin 12.0      Hematocrit 35.2      MCV 94      MCH 32.1      MCHC 34.1      RDW 13.7      Platelet Count 273      % Neutrophils 91      % Lymphocytes 2      % Monocytes 6      % Eosinophils 0      % Basophils 0      % Immature Granulocytes 1      NRBCs per 100 WBC 0      Absolute Neutrophils 25.6 (*)     Absolute Lymphocytes 0.5 (*)     Absolute Monocytes 1.7 (*)     Absolute Eosinophils 0.0      Absolute Basophils 0.1      Absolute Immature Granulocytes 0.3      Absolute NRBCs 0.0     LACTIC ACID WHOLE BLOOD WITH 1X REPEAT IN 2 HR WHEN >2 - Abnormal    Lactic Acid, Initial 0.6 (*)    INFLUENZA A/B, RSV AND SARS-COV2 PCR - Normal    Influenza A PCR Negative      Influenza B PCR Negative      RSV PCR Negative      SARS CoV2 PCR Negative     RBC AND PLATELET MORPHOLOGY    RBC Morphology Confirmed RBC Indices      Platelet Assessment        Value: Automated Count Confirmed. Platelet morphology is normal.   BLOOD CULTURE   BLOOD CULTURE        CT Chest w Contrast   Final Result   IMPRESSION:    1.   CT findings compatible with multifocal pneumonia, greatest in the right middle lobe. Clinical correlation and follow-up to resolution recommended.   2.  Associated small bilateral pleural effusions, right greater than left.   3.  Likely reactive mediastinal adenopathy.      CT Thoracic Spine w/o Contrast   Final Result    IMPRESSION:   1.  T9 vertebral body superior endplate osteophyte age-indeterminate  fracture.      2.  Otherwise, no acute fracture within the thoracic spine.        3.  Chronic spine changes described above.         CT Lumbar Spine w/o Contrast   Final Result    IMPRESSION:   1.  No acute fracture.      2.  Chronic spine changes described above.      3.  Abdominal aortic aneurysm measuring 2.6 cm TR dimension.      CT Cervical Spine w/o Contrast   Final Result    IMPRESSION:   1.  No acute fracture within the cervical spine.      Head CT w/o contrast   Final Result   IMPRESSION:   1.  No acute intracranial process.      2.  Age-related changes described above.      3.  Left sphenoid sinus air-fluid level. Please correlate for acute sinusitis.            Treatments and/or interventions provided:      Medications   iopamidol (ISOVUE-370) solution 62 mL (62 mLs Intravenous $Given 3/23/25 1844)   sodium chloride 0.9 % CT scan flush use (60 mLs Intravenous $Given 3/23/25 1845)   cefTRIAXone (ROCEPHIN) 2 g vial to attach to  ml bag for ADULTS or NS 50 ml bag for PEDS (2 g Intravenous $New Bag 3/23/25 2110)   doxycycline hyclate (VIBRAMYCIN) capsule 100 mg (100 mg Oral $Given 3/23/25 2110)        Patient's response to treatments and/or interventions:   Resting comfortably    To be done/followed up on inpatient unit:    See any in-patient orders    Does this patient have any cognitive concerns?:  None    Activity level - Baseline/Home:    Independent    Activity Level - Current:     Stand with Assist    Patient's Preferred language: English     Needed?: No    Isolation: None  Infection: Not Applicable  Patient tested for COVID 19 prior to admission: YES    Bariatric?: No    Vital Signs:   Vitals:    03/23/25 1643 03/23/25 2002 03/23/25 2007   BP: (!) 148/71 (!) 149/71    Pulse: 98 99    Resp: 16     Temp: 98.9  F (37.2  C)     TempSrc: Oral     SpO2: 96% 95% 94%   Weight: 55.8 kg (123 lb)         Cardiac Rhythm:     Was the PSS-3 completed:   Yes    Family Comments:  visited  earlier    For the majority of the shift this patient's behavior was Green.   Behavioral interventions performed were     ED NURSE PHONE NUMBER: *[phone]

## 2025-03-24 NOTE — PROGRESS NOTES
[facility]    Medicine Progress Note - Hospitalist Service    Date of Admission:  3/23/2025    Assessment & Plan   Darleen Fry is a 80 year old female with past medical history significant for asthma who presents with worsening cough, generalized weakness. Admitted on 3/23/2025.     Sepsis secondary multifocal community acquired pneumonia  Physical deconditioning   *Presented with weakness, worsening cough.  *CT chest compatible with multifocal pneumonia, greatest in RML.  *WBC 28.2 with left shift, T98.9F, HR 98, lactic acid normal.  *SpO2 stable on room air.  - Admitted to inpatient.  - Started on IV ceftriaxone and oral doxycycline in the ED, continue the same.  - Monitor O2 sats and use supplemental oxygen as needed.  - Blood cultures in process.  - Started scheduled mucinex DM.    Acute on chronic hyponatremia  Sodium 127, baseline 130-136. Previously worked up by primary care provider and suspected to have recent osmostat.  Likely acutely worsened due to poor intake.  - Received IV fluids overnight.  - BMP pending this morning, follow up on results.    Acute T9 vertebral fracture  Severe central canal stenosis, T7-T8, T8-T9   *Reports fall out of bed 3/20 with subsequent back and chest pain.  *CT thoracic spine with significant findings as above, CT cervical and lumbar spine, CT chest without additional fractures.  - Neurosurgery consulted, appreciate their assistance.  - PT consulted, appreciate their assistance.  - Scheduled acetaminophen.  - Oxycodone PRN.    Chronic kidney disease, stage 2-3  Baseline creatinine 0.9-1.1. Creatinine 1.18 on admission.   - Currently around baseline.  - IVF as above.  - Avoid nephrotoxins.  - BMP pending this morning, follow up on results.    Hyperlipidemia  - Continue PTA lovastatin or formulary equivalent.    Asthma  No sign of acute exacerbation.  - Continue PTA Advair or formulary equivalent.          Diet: Combination Diet Regular Diet Adult     DVT Prophylaxis: Enoxaparin (Lovenox) SQ  Putnam Catheter: Not present  Lines: None     Cardiac Monitoring: None  Code Status: Full Code      Clinically Significant Risk Factors Present on Admission         # Hyponatremia: Lowest Na = 127 mmol/L in last 2 days, will monitor as appropriate  # Hypochloremia: Lowest Cl = 90 mmol/L in last 2 days, will monitor as appropriate                        # Asthma: noted on problem list        Social Drivers of Health            Disposition Plan     Medically Ready for Discharge: Anticipated in 2-4 Days             Richie Turpin MD  Hospitalist Service  [facility]  Securely message with [employer] (more info)  Text page via [email or web address] Paging/Directory   ______________________________________________________________________    Interval History   Darleen Fry was seen this morning.  She continues to have a cough, productive of some whitish phlegm.  Has pain in her back and the right side of her ribs.  Denies fevers, chills, sweats, nausea, abdominal pain.  Plan of care discussed with bedside nurse.    Physical Exam   Vital Signs: Temp: 98.1  F (36.7  C) Temp src: Oral BP: 114/54 Pulse: 75   Resp: 16 SpO2: 95 % O2 Device: None (Room air)    Weight: 123 lbs 0 oz    Constitutional: awake, alert, cooperative, no apparent distress, sitting up in a chair  Respiratory: no increased work of breathing, clear to auscultation bilaterally, no crackles or wheezing  Cardiovascular: regular rate and rhythm, normal S1 and S2, no murmur noted  GI: normal bowel sounds, soft, non-distended, non-tender  Skin: warm dry  Musculoskeletal: no lower extremity pitting edema present  Neurologic: awake, alert, answers questions appropriately, moves all extremities    Medical Decision Making       40 MINUTES SPENT BY ME on the date of service doing chart review, history, exam, documentation & further activities per the note.      Data     I have personally reviewed the following data  over the past 24 hrs:    28.2 (H)  \   12.0   / 273     127 (L) 90 (L) 34.3 (H) /  180 (H)   4.4 26 1.18 (H) \     ALT: 29 AST: 36 AP: 101 TBILI: 0.5   ALB: 3.7 TOT PROTEIN: 7.5 LIPASE: N/A     Procal: N/A CRP: N/A Lactic Acid: 0.6 (L)         Imaging results reviewed over the past 24 hrs:   Recent Results (from the past 24 hours)   Head CT w/o contrast    Narrative    EXAM: CT HEAD W/O CONTRAST  LOCATION: [facility]  DATE: 3/23/2025    INDICATION: Head injury  COMPARISON: None.  TECHNIQUE: Routine CT Head without IV contrast. Multiplanar reformats. Dose reduction techniques were used.    FINDINGS:  INTRACRANIAL CONTENTS: No intracranial hemorrhage, extraaxial collection, or mass effect.  No CT evidence of acute infarct. Mild presumed chronic small vessel ischemic changes. Moderate generalized volume loss. No hydrocephalus.     VISUALIZED ORBITS/SINUSES/MASTOIDS: No intraorbital abnormality. Left posterior nasal cavity prominent polyp. Sequelae functional endoscopic sinus surgery. Mild to moderate mucosal thickening scattered about the paranasal sinuses. Left sphenoid sinus   air-fluid level. Please correlate for acute sinusitis. Scattered fluid/membrane thickening in the mastoid air cells bilaterally.    BONES/SOFT TISSUES: No acute abnormality.      Impression    IMPRESSION:  1.  No acute intracranial process.    2.  Age-related changes described above.    3.  Left sphenoid sinus air-fluid level. Please correlate for acute sinusitis.   CT Cervical Spine w/o Contrast    Narrative    EXAM: CT CERVICAL SPINE W/O CONTRAST  LOCATION: [facility]  DATE: 3/23/2025    INDICATION: Fall, acute on chronic neck pain  COMPARISON: None.  TECHNIQUE: Routine CT Cervical Spine without IV contrast. Multiplanar reformats. Dose reduction techniques were used.    FINDINGS:  VERTEBRA: No acute fracture within the cervical spine.  No acute post traumatic subluxations.   Normal vertebral  body heights. No prevertebral soft tissue swelling.    CANAL/FORAMINA: Mild multilevel spondylosis. No significant central canal stenosis.  Various levels and degrees of neural foraminal stenosis.  No significant subluxations.    PARASPINAL: No significant extraspinal abnormality.      Impression     IMPRESSION:  1.  No acute fracture within the cervical spine.   CT Lumbar Spine w/o Contrast    Narrative    EXAM: CT LUMBAR SPINE W/O CONTRAST  LOCATION: [facility]  DATE: 3/23/2025    INDICATION: Fall, low back pain  COMPARISON: None.  TECHNIQUE: Routine CT Lumbar Spine without IV contrast. Multiplanar reformats. Dose reduction techniques were used.     FINDINGS:  VERTEBRA: No acute fracture.  No acute post traumatic subluxations.   Normal vertebral body heights. Diffuse bony demineralization.    CANAL/FORAMINA: Thoracolumbar mild to moderate levoscoliosis. Multilevel spondylosis. Various levels and degrees of central canal stenosis.  L3-4 severe central canal stenosis. Various levels and degrees of neural foraminal stenosis.  No significant   subluxations. Bilateral SI degenerative joint disease.    PARASPINAL: Partially visualized hepatomegaly versus Riedel's lobe. Left kidney inferior pole cyst. Abdominal aortic aneurysm measuring 2.6 cm TR dimension.      Impression     IMPRESSION:  1.  No acute fracture.    2.  Chronic spine changes described above.    3.  Abdominal aortic aneurysm measuring 2.6 cm TR dimension.   CT Thoracic Spine w/o Contrast    Narrative    EXAM: CT THORACIC SPINE W/O CONTRAST  LOCATION: [facility]  DATE: 3/23/2025    INDICATION: Fall, thoracic back pain  COMPARISON: None.  TECHNIQUE: Routine CT Thoracic Spine without IV contrast. Multiplanar reformats. Dose reduction techniques were used.     FINDINGS:  VERTEBRA:   T9 vertebral body superior endplate osteophyte age-indeterminate fracture.     Otherwise, no acute fracture within the thoracic  spine.  No acute post traumatic subluxations.   Normal vertebral body heights. Possible diffuse bony demineralization.    CANAL/FORAMINA: Exaggerated thoracic kyphosis. Multilevel spondylosis. T7-T8, T8-T9 severe central canal stenosis. Remaining thoracic levels demonstrate no significant central canal stenosis.  No significant neural foraminal stenosis.  No significant   subluxations.    PARASPINAL: Right lung demonstrates several patchy opacities. Right middle lobe consolidation. Small right pleural effusion. Mediastinal lymphadenopathy. Please defer to concomitant CT chest.      Impression     IMPRESSION:  1.  T9 vertebral body superior endplate osteophyte age-indeterminate fracture.    2.  Otherwise, no acute fracture within the thoracic spine.      3.  Chronic spine changes described above.     CT Chest w Contrast    Narrative    EXAM: CT CHEST W CONTRAST  LOCATION: [facility]  DATE: 3/23/2025    INDICATION: Chronic cough, chest wall and back pain  COMPARISON: 5/8/2022  TECHNIQUE: CT chest with IV contrast. Multiplanar reformats were obtained. Dose reduction techniques were used.  LIMITATIONS: Motion Artifact    CONTRAST: 62mL Isovue 370    FINDINGS:   LUNGS AND PLEURA: There is new airspace disease in the inferior right middle lobe compatible with pneumonia. Milder subsegmental consolidation in the left lower lobe as well as patchy airspace nodules in the right lower lobe compatible with multifocal   pneumonitis. Trace left and small right pleural effusions.    MEDIASTINUM/AXILLAE: Normal heart size. Small pericardial effusion, slightly increased in interval. Precarinal adenopathy measuring 12 mm, image 57, with additional subcentimeter nodes increased in prominence, likely reactive in the setting of infection.    CORONARY ARTERY CALCIFICATION: Moderate.    UPPER ABDOMEN: Unremarkable    MUSCULOSKELETAL: No acute bony abnormalities. Pectus carinatum.      Impression    IMPRESSION:    1.   CT findings compatible with multifocal pneumonia, greatest in the right middle lobe. Clinical correlation and follow-up to resolution recommended.  2.  Associated small bilateral pleural effusions, right greater than left.  3.  Likely reactive mediastinal adenopathy.

## 2025-03-24 NOTE — PHARMACY-ADMISSION MEDICATION HISTORY
Pharmacist Admission Medication History    Admission medication history is complete. The information provided in this note is only as accurate as the sources available at the time of the update.    Information Source(s): Patient and CareEverywhere/SureScripts via in-person    Pertinent Information: patient has home supply of Tamiflu from fill 2/7/25 but never started course, did not add to PTA med list. Completed prednisone course prescribed the same time.     Changes made to PTA medication list:  Added: None  Deleted: marked omega 3 basically not taking, removed omeprazole 40 mg daily (last filled 11/13/24 #30ds)   Changed:   Flonase to PRN  Advair PRN to BID     Allergies reviewed with patient and updates made in EHR: no    Medication History Completed By: An Flannery [facility] 3/23/2025 10:26 PM    PTA Med List   Medication Sig Note Last Dose/Taking    albuterol (ALBUTEROL) 108 (90 BASE) MCG/ACT inhaler Inhale 2 puffs into the lungs every 6 hours as needed  3/22/2025    calcium carbonate (OS-DENI) 500 MG tablet Take 1 tablet by mouth 2 times daily 3/23/2025: Unsure if taking once or twice daily, did not adjust at this time 3/23/2025 Morning    fluticasone (FLONASE) 50 MCG/ACT nasal spray Spray 1-2 sprays into both nostrils daily as needed for rhinitis or allergies.  Unknown    fluticasone-salmeterol (ADVAIR) 250-50 MCG/ACT inhaler Inhale 1 puff into the lungs every 12 hours. 3/23/2025: Patient will take once or twice daily depending on symptoms 3/22/2025 Evening    lovastatin (MEVACOR) 40 MG tablet Take 1 tablet (40 mg) by mouth daily  Past Week    MULTI-VITAMIN OR TABS Take 1 tablet by mouth daily.  3/23/2025 Morning    Omega-3 Fatty Acids (EPA PO) Take 1 teaspoonful by mouth daily 3/23/2025: Patient states not taking in years but does not want removed from PTA med list More than a month

## 2025-03-24 NOTE — CONSULTS
[facility]    Neurosurgery Consultation     Date of Admission:  3/23/2025  Date of Consult (When I saw the patient): 03/24/25    Assessment & Plan   80 year old female presented to the emergency department for evaluation of a cough and feeling generally unwell found to have pneumonia.  Admitted 3/23/2025.  Neurosurgery consulted for T9 compression fracture.  On 3/20/2025, patient had a fall off her bed at home striking her back and chest on surrounding objects.    Patient currently denies middle/lower back pain, lower extremity radicular pain, paresthesias, weakness.  Reports chronic urinary urgency/incontinence requiring her to use depends over the past 4 years.  Chronic balance difficulties; however patient does not utilize any assistive devices at baseline.  Reports known thoracic stenosis previously seen by spine specialist; however patient is unable to expand on which spine specialist and timing.     DEXA scan 2019 demonstrating osteopenia, currently on calcium and multivitamin.    On examination, patient is nontender over thoracic and lumbar spine and paraspinous muscles.  Moving all extremities without focal neurological deficits.    Thoracic CT demonstrates T9 vertebral body superior endplate osteophyte age-indeterminate fracture as well as T7-T8 and T8-T9 severe canal stenosis, no significant neuroforaminal stenosis.    Imaging reviewed and discussed with patient today.  Given patient's lack of back pain or tenderness to palpation over the thoracic spine T9 fracture is likely subacute/chronic.  Would not recommend any additional imaging, bracing, follow-up regarding T9 vertebral body superior endplate osteophyte age-indeterminate fracture. Okay for patient to follow-up in outpatient neurosurgery clinic regarding his severe thoracic canal stenosis.      Imaging:  Imaging Interpretation provided by radiologist.   EXAM: CT THORACIC SPINE W/O CONTRAST  LOCATION: [facility]  DATE: 3/23/2025     INDICATION: Fall, thoracic back pain  COMPARISON: None.  TECHNIQUE: Routine CT Thoracic Spine without IV contrast. Multiplanar reformats. Dose reduction techniques were used.      FINDINGS:  VERTEBRA:   T9 vertebral body superior endplate osteophyte age-indeterminate fracture.      Otherwise, no acute fracture within the thoracic spine.  No acute post traumatic subluxations.   Normal vertebral body heights. Possible diffuse bony demineralization.     CANAL/FORAMINA: Exaggerated thoracic kyphosis. Multilevel spondylosis. T7-T8, T8-T9 severe central canal stenosis. Remaining thoracic levels demonstrate no significant central canal stenosis.  No significant neural foraminal stenosis.  No significant   subluxations.     PARASPINAL: Right lung demonstrates several patchy opacities. Right middle lobe consolidation. Small right pleural effusion. Mediastinal lymphadenopathy. Please defer to concomitant CT chest.                                                            IMPRESSION:  1.  T9 vertebral body superior endplate osteophyte age-indeterminate fracture.     2.  Otherwise, no acute fracture within the thoracic spine.       3.  Chronic spine changes described above.      NSGY Recommendations:  T9 age-indeterminate superior endplate osteophyte fracture is likely chronic in nature due to lack of back pain on physical exam.  No further imaging or bracing necessary.    Okay for patient to follow-up in outpatient neurosurgery clinic regarding thoracic stenosis demonstrated on thoracic CT today.  Scheduling information placed in navigator.    Patient okay for discharge from neurosurgical point of care once medically stable.    NSGY signing off.    Please contact on call NSGY CINDY via [employer] system for questions or concerns.     I have discussed the following assessment and plan with Dr. Saravia who is in agreement with initial plan and will follow up with further consultation  recommendations.    Joelle Ho PA-C  [facility] Neurosurgery  [address] [address]    Text page via [employer] Paging/Directory    Code Status    Full Code    Reason for Consult   Reason for consult: T9 compression fracture    Primary Care Physician   Justin Saravia    Chief Complaint   Generalized weakness    History is obtained from the patient and chart review    History of Present Illness   Darleen Fry is a 80 year old female presented to the emergency department for evaluation of a cough and feeling generally unwell found to have pneumonia.  On 3/20/2025, patient had a fall off her bed at home striking her back and chest on surrounding objects.    Patient currently denies middle/lower back pain, lower extremity radicular pain, paresthesias, weakness.  Reports chronic urinary urgency/incontinence requiring her to use depends over the past 4 years.  Chronic balance difficulties; however patient does not utilize any assistive devices at baseline.  Reports known thoracic stenosis previously seen by spine specialist; however patient is unable to expand on which spine specialist and timing.     DEXA scan 2019 demonstrating osteopenia, currently on calcium and multivitamin.    Past Medical History   I have reviewed this patient's medical history and updated it with pertinent information if needed.   Past Medical History:   Diagnosis Date    Anxiety     Asthma     Chronic kidney disease     GERD (gastroesophageal reflux disease)     Hyperlipidemia LDL goal <100     Irritable bowel syndrome     JUDITH (obstructive sleep apnea)     Osteopenia     Pericardial effusion     Post herpetic neuralgia     Seasonal allergies     TMJ arthralgia        Past Surgical History   I have reviewed this patient's surgical history and updated it with pertinent information if needed.  Past Surgical History:   Procedure Laterality Date    D & C  2010     NASAL SURG PROC  UNLISTED  1989 - POLYPS X 7 - 8/ 08/2007    HC TOOTH EXTRACTION W/FORCEP      SURGERY GENERAL IP CONSULT  Urethal caruncle       Prior to Admission Medications   Prior to Admission Medications   Prescriptions Last Dose Informant Patient Reported? Taking?   MULTI-VITAMIN OR TABS 3/23/2025 Morning Self Yes Yes   Sig: Take 1 tablet by mouth daily.   Omega-3 Fatty Acids (EPA PO) More than a month Self Yes Yes   Sig: Take 1 teaspoonful by mouth daily   albuterol (ALBUTEROL) 108 (90 BASE) MCG/ACT inhaler 3/22/2025 Self No Yes   Sig: Inhale 2 puffs into the lungs every 6 hours as needed   calcium carbonate (OS-DENI) 500 MG tablet 3/23/2025 Morning Self Yes Yes   Sig: Take 1 tablet by mouth 2 times daily   fluticasone (FLONASE) 50 MCG/ACT nasal spray Unknown Self Yes Yes   Sig: Spray 1-2 sprays into both nostrils daily as needed for rhinitis or allergies.   fluticasone-salmeterol (ADVAIR) 250-50 MCG/ACT inhaler 3/22/2025 Evening Self Yes Yes   Sig: Inhale 1 puff into the lungs every 12 hours.   lovastatin (MEVACOR) 40 MG tablet Past Week Self No Yes   Sig: Take 1 tablet (40 mg) by mouth daily      Facility-Administered Medications: None     Allergies   Allergies   Allergen Reactions    Alatrofloxacin Mesylate Injection     Aspirin     Cefprozil     Erythromycin      Allergic all mycins    Nsaids     Sulfa Antibiotics     Levaquin [Levofloxacin] Rash       Social History   I have reviewed this patient's social history and updated it with pertinent information if needed. Darleen Fry  reports that she has never smoked. She has never used smokeless tobacco. She reports that she does not currently use alcohol. She reports that she does not use drugs.    Family History   I have reviewed this patient's family history and updated it with pertinent information if needed.   Family History   Problem Relation Age of Onset    Prostate Cancer Father     Arthritis Son         gout    Connective Tissue Disorder Daughter         SLE     Breast Cancer No family hx of        ROS: 10 point ROS negative other than the symptoms noted above in the HPI.    Physical Exam   Temp: 98.1  F (36.7  C) Temp src: Oral BP: 114/54 Pulse: 75   Resp: 16 SpO2: 95 % O2 Device: None (Room air)    Vital Signs with Ranges  Temp:  [98.1  F (36.7  C)-99.3  F (37.4  C)] 98.1  F (36.7  C)  Pulse:  [75-99] 75  Resp:  [16] 16  BP: (113-161)/(52-94) 114/54  SpO2:  [92 %-96 %] 95 %  123 lbs 0 oz     , Blood pressure 114/54, pulse 75, temperature 98.1  F (36.7  C), temperature source Oral, resp. rate 16, weight 55.8 kg (123 lb), SpO2 95%, not currently breastfeeding.  123 lbs 0 oz     Sitting up in hospital chair.  NAD.  HEENT:  Normocephalic, atraumatic.  PERRL.  EOM s intact.   NEUROLOGICAL EXAMINATION:   Mental status:  Alert and Oriented x 3, speech is fluent.  Cranial nerves:  II-XII intact.   Motor:   Hip Flexion:                    Right: 5/5  Left:  5/5  Knee Flexion:                 Right:  5/5  Left:  5/5  Knee Extension:             Right:  5/5  Left:  5/5  Dorsiflexion:                   Right:  5/5  Left:  5/5  Plantar Flexion:             Right:  5/5  Left:  5/5  EHL:                              Right:  5/5  Left:  5/5  Sensation:  Intact to light touch throughout BUE/BLE    Reflexes:  Negative Clonus Bilaterally.   Nontender to palpation over thoracic and lumbar spine and paraspinous muscles.  Patient grabbing right lateral chest wall/scapula for area of intermittent discomfort.    Data     CBC RESULTS:   Recent Labs   Lab Test 03/24/25  1000   WBC 28.1*   RBC 3.51*   HGB 11.2*   HCT 33.1*   MCV 94   MCH 31.9   MCHC 33.8   RDW 13.9        Basic Metabolic Panel:  Lab Results   Component Value Date     03/24/2025     08/09/2015      Lab Results   Component Value Date    POTASSIUM 3.8 03/24/2025    POTASSIUM 3.8 05/08/2022    POTASSIUM 4.0 08/09/2015     Lab Results   Component Value Date    CHLORIDE 92 03/24/2025    CHLORIDE 99 05/08/2022     "CHLORIDE 107 08/09/2015     Lab Results   Component Value Date    DENI 9.1 03/24/2025    DENI 8.3 08/09/2015     Lab Results   Component Value Date    CO2 25 03/24/2025    CO2 26 05/08/2022    CO2 26 08/09/2015     Lab Results   Component Value Date    BUN 24.4 03/24/2025    BUN 29 05/08/2022    BUN 29 08/09/2015     Lab Results   Component Value Date    CR 0.93 03/24/2025    CR 1.08 08/09/2015     Lab Results   Component Value Date     03/24/2025    GLC 82 05/08/2022     08/09/2015     INR:  No results found for: \"INR\"      "

## 2025-03-24 NOTE — PROGRESS NOTES
Shift Summary [id number]    Admitting Diagnosis: Falls frequently [R29.6]  Closed fracture of thoracic vertebral body (H) [S22.009A]  Multifocal pneumonia [J18.9]  Sepsis without acute organ dysfunction, due to unspecified organism (H) [A41.9]     Vitals WDL ex low grade fever. On RA  Pt c/o back pain. Declines request for pain meds  A&Ox4  Voiding in BR  Mobility A of 2 maykel stedy  CMS intact  GI last BM 3/23  PIV infusing LR @ 100 ml  Int cough  Pills whole in apple sauce, crush the large pills    Orders Placed This Encounter      Combination Diet Regular Diet Adult       Plan: BC/UC pending. Neurosurg consulted

## 2025-03-24 NOTE — H&P
[facility]    History and Physical - Hospitalist Service       Date of Admission:  3/23/2025    Assessment & Plan   Darleen Fry is a 80 year old female with past medical history significant for asthma who presents with worsening cough, generalized weakness. Admitted on 3/23/2025.     Sepsis secondary multifocal community acquired pneumonia  Physical deconditioning   *Presents with weakness, worsening cough  *CT chest compatible with multifocal pneumonia, greatest in RML  *WBC 28.2 with left shift, T98.9F, HR 98, lactic acid normal  *SpO2 stable on room air  - Ceftriaxone IV, doxycycline PO  - Oxygen as needed   - Blood cultures in process  - Monitor fever curve  - Trend WBC    - PT consulted    Acute on chronic hyponatremia  Sodium 127, baseline 130-136. Previously worked up by primary care provider and suspected to have recent osmostat.  Likely acutely worsened due to poor intake.  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 Creatinine 1.18 on admission.   - Currently around baseline  - IVF as above  - Avoid nephrotoxins  - Monitor BMP     Hyperlipidemia  - Continue prior to admission formulary equivalent statin    Asthma  No sign of acute exacerbation.  - Continue prior to admission inhaler regimen (or formulary equivalent)       Diet: Regular diet   DVT Prophylaxis: Enoxaparin (Lovenox) SQ  Putnam Catheter: Not present  Code Status: Full code    Disposition Plan   Admit to inpatient.      Medically Ready for Discharge: Anticipated in 2-4 Days       Entered: Sven Lopez MD 03/23/2025, 9:11 PM     The patient's  care was discussed with the ED provider, patient        Clinically Significant Risk Factors Present on Admission         # Hyponatremia: Lowest Na = 127 mmol/L in last 2 days, will monitor as appropriate  # Hypochloremia: Lowest Cl = 90 mmol/L in last 2 days, will monitor as appropriate                        # Asthma: noted on problem list             Sven Lopez MD  [facility]    ______________________________________________________________________    Chief Complaint   Cough, weakness, fall    History of Present Illness   Darleen Fry is a 80 year old female who presents with the above chief complaint.    History is obtained from the patient, discussion with ED provider and review of medical record. The patient reports she initially developed a cough and felt very unwell in early February after her  had similar symptoms initially. She was seen 2/18 by her primary care provider and prescribed 6 days of prednisone for suspected viral bronchitis.  She continued to have an ongoing cough, which has worsened and become more productive of white sputum over the past few days.  On 3/20 she had a fall from her bed striking her back and chest and since has had pain which is worse with movement and palpation.  She reports feeling feverish on that day, has not had a measured fever.  She reports she has been taking her albuterol and attempt to expectorate more sputum.  She reports her intake has been decreased over the past few days.  She has been feeling more generally weak and so presented to the emergency department for further evaluation.  She denies any shooting pains into her legs, numbness/tingling/weakness in her legs.    In the Emergency Department, the patient was seen by Dr. Macias, with whom I discussed the patient's presenting symptoms and emergency department course.  Initial vital signs were a temperature of 98.9F, HR 98, /71, RR 16, SpO2 96% on room air. Pertinent  work-up as noted in A&P. The patient received IV ceftriaxone, PO doxycycline and Hospitalists were contacted for admission to the hospital.     Past Medical History    I have reviewed this patient's medical history and updated it with pertinent information if needed.   Past Medical History:   Diagnosis Date    Asthma     Hyperlipidemia LDL goal <100     Osteopenia     Seasonal allergies        Past Surgical History   I have reviewed this patient's surgical history and updated it with pertinent information if needed.  Past Surgical History:   Procedure Laterality Date    D & C  2010    HC NASAL SURG PROC UNLISTED  1989 - POLYPS X 7 - 8/ 08/2007    HC TOOTH EXTRACTION W/FORCEP      SURGERY GENERAL IP CONSULT  Urethal caruncle         Social History   I have reviewed this patient's social history and updated it with pertinent information if needed.  Social History     Tobacco Use    Smoking status: Never    Smokeless tobacco: Never   Substance Use Topics    Alcohol use: Not Currently     Comment: rare    Drug use: No        Family History   I have reviewed this patient's family history and updated it with pertinent information if needed.   Family History   Problem Relation Age of Onset    Prostate Cancer Father     Arthritis Son         gout    Connective Tissue Disorder Daughter         SLE    Breast Cancer No family hx of         Prior to Admission Medications    Prior to Admission Medications   Prescriptions Last Dose Informant Patient Reported? Taking?   MULTI-VITAMIN OR TABS 3/23/2025 Morning Self Yes Yes   Sig: Take 1 tablet by mouth daily.   Omega-3 Fatty Acids (EPA PO) More than a month Self Yes Yes   Sig: Take 1 teaspoonful by mouth daily   albuterol (ALBUTEROL) 108 (90 BASE) MCG/ACT inhaler 3/22/2025 Self No Yes   Sig: Inhale 2 puffs into the lungs every 6 hours as needed   calcium carbonate (OS-DENI) 500 MG tablet 3/23/2025 Morning Self Yes Yes   Sig: Take 1 tablet by mouth 2 times daily   fluticasone  (FLONASE) 50 MCG/ACT nasal spray Unknown Self Yes Yes   Sig: Spray 1-2 sprays into both nostrils daily as needed for rhinitis or allergies.   fluticasone-salmeterol (ADVAIR) 250-50 MCG/ACT inhaler 3/22/2025 Evening Self Yes Yes   Sig: Inhale 1 puff into the lungs every 12 hours.   lovastatin (MEVACOR) 40 MG tablet Past Week Self No Yes   Sig: Take 1 tablet (40 mg) by mouth daily      Facility-Administered Medications: None     Allergies   Allergies   Allergen Reactions    Alatrofloxacin Mesylate Injection     Aspirin     Cefprozil     Erythromycin      Allergic all mycins    Nsaids     Sulfa Antibiotics     Levaquin [Levofloxacin] Rash       Physical Exam   Vital Signs: Temp: 98.9  F (37.2  C) Temp src: Oral BP: (!) 149/71 Pulse: 99   Resp: 16 SpO2: 94 %      Weight: 123 lbs 0 oz    Constitutional: NAD  Respiratory: Shallow inspiratory effort limiting exam, no crackles or wheezes  Cardiovascular: RRR, no m/r/g. No peripheral edema.   GI: Soft, non-tender, non-distended. No rebound tenderness or guarding.    Skin: Warm, dry   Musculoskeletal:  Point tenderness to palpation over thoracic spine  Neurologic: Alert. Responding to questions appropriately. Following commands.    Psychiatric: Normal affect, appropriate      Data   Data reviewed today: I reviewed all medications, new labs and imaging results over the last 24 hours. I personally reviewed the EKG tracing showing sinus rhythm, RBBB .    Recent Labs   Lab 03/23/25  1714   WBC 28.2*   HGB 12.0   MCV 94      *   POTASSIUM 4.4   CHLORIDE 90*   CO2 26   BUN 34.3*   CR 1.18*   ANIONGAP 11   DENI 9.4   *   ALBUMIN 3.7   PROTTOTAL 7.5   BILITOTAL 0.5   ALKPHOS 101   ALT 29   AST 36       Recent Results (from the past 24 hours)   Head CT w/o contrast    Narrative    EXAM: CT HEAD W/O CONTRAST  LOCATION: [facility]  DATE: 3/23/2025    INDICATION: Head injury  COMPARISON: None.  TECHNIQUE: Routine CT Head without IV contrast.  Multiplanar reformats. Dose reduction techniques were used.    FINDINGS:  INTRACRANIAL CONTENTS: No intracranial hemorrhage, extraaxial collection, or mass effect.  No CT evidence of acute infarct. Mild presumed chronic small vessel ischemic changes. Moderate generalized volume loss. No hydrocephalus.     VISUALIZED ORBITS/SINUSES/MASTOIDS: No intraorbital abnormality. Left posterior nasal cavity prominent polyp. Sequelae functional endoscopic sinus surgery. Mild to moderate mucosal thickening scattered about the paranasal sinuses. Left sphenoid sinus   air-fluid level. Please correlate for acute sinusitis. Scattered fluid/membrane thickening in the mastoid air cells bilaterally.    BONES/SOFT TISSUES: No acute abnormality.      Impression    IMPRESSION:  1.  No acute intracranial process.    2.  Age-related changes described above.    3.  Left sphenoid sinus air-fluid level. Please correlate for acute sinusitis.   CT Cervical Spine w/o Contrast    Narrative    EXAM: CT CERVICAL SPINE W/O CONTRAST  LOCATION: [facility]  DATE: 3/23/2025    INDICATION: Fall, acute on chronic neck pain  COMPARISON: None.  TECHNIQUE: Routine CT Cervical Spine without IV contrast. Multiplanar reformats. Dose reduction techniques were used.    FINDINGS:  VERTEBRA: No acute fracture within the cervical spine.  No acute post traumatic subluxations.   Normal vertebral body heights. No prevertebral soft tissue swelling.    CANAL/FORAMINA: Mild multilevel spondylosis. No significant central canal stenosis.  Various levels and degrees of neural foraminal stenosis.  No significant subluxations.    PARASPINAL: No significant extraspinal abnormality.      Impression     IMPRESSION:  1.  No acute fracture within the cervical spine.   CT Lumbar Spine w/o Contrast    Narrative    EXAM: CT LUMBAR SPINE W/O CONTRAST  LOCATION: [facility]  DATE: 3/23/2025    INDICATION: Fall, low back pain  COMPARISON:  None.  TECHNIQUE: Routine CT Lumbar Spine without IV contrast. Multiplanar reformats. Dose reduction techniques were used.     FINDINGS:  VERTEBRA: No acute fracture.  No acute post traumatic subluxations.   Normal vertebral body heights. Diffuse bony demineralization.    CANAL/FORAMINA: Thoracolumbar mild to moderate levoscoliosis. Multilevel spondylosis. Various levels and degrees of central canal stenosis.  L3-4 severe central canal stenosis. Various levels and degrees of neural foraminal stenosis.  No significant   subluxations. Bilateral SI degenerative joint disease.    PARASPINAL: Partially visualized hepatomegaly versus Riedel's lobe. Left kidney inferior pole cyst. Abdominal aortic aneurysm measuring 2.6 cm TR dimension.      Impression     IMPRESSION:  1.  No acute fracture.    2.  Chronic spine changes described above.    3.  Abdominal aortic aneurysm measuring 2.6 cm TR dimension.   CT Thoracic Spine w/o Contrast    Narrative    EXAM: CT THORACIC SPINE W/O CONTRAST  LOCATION: [facility]  DATE: 3/23/2025    INDICATION: Fall, thoracic back pain  COMPARISON: None.  TECHNIQUE: Routine CT Thoracic Spine without IV contrast. Multiplanar reformats. Dose reduction techniques were used.     FINDINGS:  VERTEBRA:   T9 vertebral body superior endplate osteophyte age-indeterminate fracture.     Otherwise, no acute fracture within the thoracic spine.  No acute post traumatic subluxations.   Normal vertebral body heights. Possible diffuse bony demineralization.    CANAL/FORAMINA: Exaggerated thoracic kyphosis. Multilevel spondylosis. T7-T8, T8-T9 severe central canal stenosis. Remaining thoracic levels demonstrate no significant central canal stenosis.  No significant neural foraminal stenosis.  No significant   subluxations.    PARASPINAL: Right lung demonstrates several patchy opacities. Right middle lobe consolidation. Small right pleural effusion. Mediastinal lymphadenopathy. Please defer  to concomitant CT chest.      Impression     IMPRESSION:  1.  T9 vertebral body superior endplate osteophyte age-indeterminate fracture.    2.  Otherwise, no acute fracture within the thoracic spine.      3.  Chronic spine changes described above.     CT Chest w Contrast    Narrative    EXAM: CT CHEST W CONTRAST  LOCATION: [facility]  DATE: 3/23/2025    INDICATION: Chronic cough, chest wall and back pain  COMPARISON: 5/8/2022  TECHNIQUE: CT chest with IV contrast. Multiplanar reformats were obtained. Dose reduction techniques were used.  LIMITATIONS: Motion Artifact    CONTRAST: 62mL Isovue 370    FINDINGS:   LUNGS AND PLEURA: There is new airspace disease in the inferior right middle lobe compatible with pneumonia. Milder subsegmental consolidation in the left lower lobe as well as patchy airspace nodules in the right lower lobe compatible with multifocal   pneumonitis. Trace left and small right pleural effusions.    MEDIASTINUM/AXILLAE: Normal heart size. Small pericardial effusion, slightly increased in interval. Precarinal adenopathy measuring 12 mm, image 57, with additional subcentimeter nodes increased in prominence, likely reactive in the setting of infection.    CORONARY ARTERY CALCIFICATION: Moderate.    UPPER ABDOMEN: Unremarkable    MUSCULOSKELETAL: No acute bony abnormalities. Pectus carinatum.      Impression    IMPRESSION:   1.   CT findings compatible with multifocal pneumonia, greatest in the right middle lobe. Clinical correlation and follow-up to resolution recommended.  2.  Associated small bilateral pleural effusions, right greater than left.  3.  Likely reactive mediastinal adenopathy.

## 2025-03-24 NOTE — PROGRESS NOTES
RECEIVING UNIT ED HANDOFF REVIEW    ED Nurse Handoff Report was reviewed by: Hayley Martinez RN on March 24, 2025 at 12:41 AM

## 2025-03-24 NOTE — PROGRESS NOTES
Admitting Diagnosis: Falls frequently [R29.6]  Closed fracture of thoracic vertebral body (H) [S22.009A]  Multifocal pneumonia [J18.9]  Sepsis without acute organ dysfunction, due to unspecified organism (H) [A41.9]   Vitals VSS  Pain 0/10. Taking n/a PRN. Last dose n/a  A&Ox4  Voiding Yes  Mobility Assist of 1 GB/W  Tele n/a  CMS Intact  Lung Sounds diminished on RA via n/a. Productive coughs, was encouraged to use the IS.  GI Active. 1 BM today.  Dressing n/a    Orders Placed This Encounter      Combination Diet Regular Diet Adult       Plan: Pt is to work with PT tomorrow.

## 2025-03-25 ENCOUNTER — APPOINTMENT (OUTPATIENT)
Dept: PHYSICAL THERAPY | Facility: CLINIC | Age: 80
DRG: 871 | End: 2025-03-25
Attending: INTERNAL MEDICINE
Payer: COMMERCIAL

## 2025-03-25 LAB
ANION GAP SERPL CALCULATED.3IONS-SCNC: 8 MMOL/L (ref 7–15)
BUN SERPL-MCNC: 25.9 MG/DL (ref 8–23)
CALCIUM SERPL-MCNC: 8.9 MG/DL (ref 8.8–10.4)
CHLORIDE SERPL-SCNC: 96 MMOL/L (ref 98–107)
CREAT SERPL-MCNC: 0.85 MG/DL (ref 0.51–0.95)
EGFRCR SERPLBLD CKD-EPI 2021: 68 ML/MIN/1.73M2
ERYTHROCYTE [DISTWIDTH] IN BLOOD BY AUTOMATED COUNT: 13.9 % (ref 10–15)
GLUCOSE SERPL-MCNC: 91 MG/DL (ref 70–99)
HCO3 SERPL-SCNC: 28 MMOL/L (ref 22–29)
HCT VFR BLD AUTO: 32.7 % (ref 35–47)
HGB BLD-MCNC: 11.2 G/DL (ref 11.7–15.7)
MCH RBC QN AUTO: 31.7 PG (ref 26.5–33)
MCHC RBC AUTO-ENTMCNC: 34.3 G/DL (ref 31.5–36.5)
MCV RBC AUTO: 93 FL (ref 78–100)
PLATELET # BLD AUTO: 337 10E3/UL (ref 150–450)
POTASSIUM SERPL-SCNC: 4.3 MMOL/L (ref 3.4–5.3)
RBC # BLD AUTO: 3.53 10E6/UL (ref 3.8–5.2)
SODIUM SERPL-SCNC: 132 MMOL/L (ref 135–145)
TSH SERPL DL<=0.005 MIU/L-ACNC: 1.08 UIU/ML (ref 0.3–4.2)
WBC # BLD AUTO: 16.4 10E3/UL (ref 4–11)

## 2025-03-25 PROCEDURE — 258N000003 HC RX IP 258 OP 636: Performed by: INTERNAL MEDICINE

## 2025-03-25 PROCEDURE — G0463 HOSPITAL OUTPT CLINIC VISIT: HCPCS

## 2025-03-25 PROCEDURE — 97530 THERAPEUTIC ACTIVITIES: CPT | Mod: GP | Performed by: PHYSICAL THERAPIST

## 2025-03-25 PROCEDURE — 85018 HEMOGLOBIN: CPT | Performed by: HOSPITALIST

## 2025-03-25 PROCEDURE — 250N000011 HC RX IP 250 OP 636: Performed by: INTERNAL MEDICINE

## 2025-03-25 PROCEDURE — 120N000001 HC R&B MED SURG/OB

## 2025-03-25 PROCEDURE — 97161 PT EVAL LOW COMPLEX 20 MIN: CPT | Mod: GP | Performed by: PHYSICAL THERAPIST

## 2025-03-25 PROCEDURE — 99232 SBSQ HOSP IP/OBS MODERATE 35: CPT | Performed by: HOSPITALIST

## 2025-03-25 PROCEDURE — 82310 ASSAY OF CALCIUM: CPT | Performed by: HOSPITALIST

## 2025-03-25 PROCEDURE — 36415 COLL VENOUS BLD VENIPUNCTURE: CPT | Performed by: HOSPITALIST

## 2025-03-25 PROCEDURE — 250N000013 HC RX MED GY IP 250 OP 250 PS 637: Performed by: HOSPITALIST

## 2025-03-25 PROCEDURE — 97116 GAIT TRAINING THERAPY: CPT | Mod: GP | Performed by: PHYSICAL THERAPIST

## 2025-03-25 PROCEDURE — 250N000013 HC RX MED GY IP 250 OP 250 PS 637: Performed by: INTERNAL MEDICINE

## 2025-03-25 PROCEDURE — 80048 BASIC METABOLIC PNL TOTAL CA: CPT | Performed by: HOSPITALIST

## 2025-03-25 RX ADMIN — ENOXAPARIN SODIUM 40 MG: 40 INJECTION SUBCUTANEOUS at 08:37

## 2025-03-25 RX ADMIN — DOXYCYCLINE HYCLATE 100 MG: 100 CAPSULE ORAL at 08:32

## 2025-03-25 RX ADMIN — SODIUM CHLORIDE, SODIUM LACTATE, POTASSIUM CHLORIDE, AND CALCIUM CHLORIDE: .6; .31; .03; .02 INJECTION, SOLUTION INTRAVENOUS at 21:10

## 2025-03-25 RX ADMIN — FLUTICASONE FUROATE AND VILANTEROL TRIFENATATE 1 PUFF: 100; 25 POWDER RESPIRATORY (INHALATION) at 08:41

## 2025-03-25 RX ADMIN — ATORVASTATIN CALCIUM 10 MG: 10 TABLET, FILM COATED ORAL at 08:32

## 2025-03-25 RX ADMIN — ACETAMINOPHEN 975 MG: 325 TABLET, FILM COATED ORAL at 18:52

## 2025-03-25 RX ADMIN — DOXYCYCLINE HYCLATE 100 MG: 100 CAPSULE ORAL at 20:20

## 2025-03-25 RX ADMIN — GUAIFENESIN AND DEXTROMETHORPHAN HYDROBROMIDE 1 TABLET: 600; 30 TABLET, EXTENDED RELEASE ORAL at 20:20

## 2025-03-25 RX ADMIN — GUAIFENESIN AND DEXTROMETHORPHAN HYDROBROMIDE 1 TABLET: 600; 30 TABLET, EXTENDED RELEASE ORAL at 08:32

## 2025-03-25 RX ADMIN — CEFTRIAXONE SODIUM 2 G: 2 INJECTION, POWDER, FOR SOLUTION INTRAMUSCULAR; INTRAVENOUS at 20:26

## 2025-03-25 RX ADMIN — ACETAMINOPHEN 975 MG: 325 TABLET, FILM COATED ORAL at 08:32

## 2025-03-25 RX ADMIN — SODIUM CHLORIDE, SODIUM LACTATE, POTASSIUM CHLORIDE, AND CALCIUM CHLORIDE: .6; .31; .03; .02 INJECTION, SOLUTION INTRAVENOUS at 01:41

## 2025-03-25 ASSESSMENT — ACTIVITIES OF DAILY LIVING (ADL)
ADLS_ACUITY_SCORE: 68
ADLS_ACUITY_SCORE: 72
ADLS_ACUITY_SCORE: 68
ADLS_ACUITY_SCORE: 68
ADLS_ACUITY_SCORE: 72
ADLS_ACUITY_SCORE: 68
ADLS_ACUITY_SCORE: 72
ADLS_ACUITY_SCORE: 72
ADLS_ACUITY_SCORE: 68
ADLS_ACUITY_SCORE: 72
ADLS_ACUITY_SCORE: 68

## 2025-03-25 NOTE — CONSULTS
Care Management Initial Consult    General Information  Assessment completed with: Patient, Spouse or significant other,    Type of CM/SW Visit: Initial Assessment    Primary Care Provider verified and updated as needed: Yes   Readmission within the last 30 days:        Reason for Consult: discharge planning  Advance Care Planning: Advance Care Planning Reviewed: no concerns identified          Communication Assessment  Patient's communication style: spoken language (English or Bilingual)             Cognitive  Cognitive/Neuro/Behavioral: WDL                      Living Environment:   People in home: spouse     Current living Arrangements: house      Able to return to prior arrangements: yes       Family/Social Support:  Care provided by: self  Provides care for: no one, unable/limited ability to care for self  Marital Status:   Support system:           Description of Support System: Supportive         Current Resources:   Patient receiving home care services: No        Community Resources: None  Equipment currently used at home: none  Supplies currently used at home: None    Employment/Financial:  Employment Status: retired        Financial Concerns: none           Does the patient's insurance plan have a 3 day qualifying hospital stay waiver?  No    Lifestyle & Psychosocial Needs:  Social Drivers of Health     Food Insecurity: No Food Insecurity (3/22/2024)    Received from [employer]    Food Insecurity     Do you worry your food will run out before you are able to buy more?: 1   Depression: Not at risk (9/11/2024)    Received from [employer]    PHQ-2     PHQ-2 TOTAL SCORE: 0   Housing Stability: Low Risk  (3/22/2024)    Received from [employer]    Housing Stability     What is your housing situation today?: 1   Tobacco Use: Low Risk  (3/24/2025)    Patient History     Smoking Tobacco Use: Never      Smokeless Tobacco Use: Never     Passive Exposure: Not on file   Financial Resource Strain: Low Risk  (3/22/2024)    Received from [facility]    Financial Resource Strain     Difficulty of Paying Living Expenses: 3     Difficulty of Paying Living Expenses: Not on file   Alcohol Use: Not on file   Transportation Needs: No Transportation Needs (3/22/2024)    Received from [facility]    Transportation Needs     Does lack of transportation keep you from medical appointments?: 1     Does lack of transportation keep you from work, meetings or getting things that you need?: 1   Physical Activity: Not on file   Interpersonal Safety: Not on file   Stress: Not on file   Social Connections: Socially Integrated (3/22/2024)    Received from [facility]    Social Connections     Do you often feel lonely or isolated from those around you?: 0   Health Literacy: Not on file       Functional Status:  Prior to admission patient needed assistance:   Dependent ADLs:: Independent, Ambulation-no assistive device  Dependent IADLs:: Independent, Cooking, Cleaning, Laundry, Shopping, Medication Management       Mental Health Status:  Mental Health Status: No Current Concerns       Chemical Dependency Status:  Chemical Dependency Status: No Current Concerns             Values/Beliefs:  Spiritual, Cultural Beliefs, Nondenominational Practices, Values that affect care: no               Discussed  Partnership in Safe Discharge Planning  document with patient/family: No    Additional Information:  Met with patient to introduce self and discuss role of discharge planning.  Confirmed information above assessment, please see each section of above for details.  Patient lives in house with her , there are 2 steps to enter and 12  steps within the home; normally independent with ADLs/IADLs.    Pt's  stated they will make their own PCP appt for  discharge.  Pt PCP is   Justin Saravia [phone] [address] [facility] [address], [facility] [address]    Pt currently has no home care services/community services.    Pt anticipates may need help with home care prior to discharge.    Referral to be sent to initiate HHC for PT.  Next Steps:   Care Management to follow for discharge needs.      Ester Gonsalez, Care Management RN, CHENG  [facility] - FLOAT   Contact: via [facility]

## 2025-03-25 NOTE — PLAN OF CARE
Goal Outcome Evaluation:      Plan of Care Reviewed With: patient, spouse          Outcome Evaluation: Care management will continue to follow for discharge needs.

## 2025-03-25 NOTE — DISCHARGE INSTRUCTIONS
Wound care: Base of coccyx crease  Clean with soap and water. Pat dry  Apply a barrier ointment (ex Desitin)

## 2025-03-25 NOTE — PLAN OF CARE
Goal Outcome Evaluation:         DATE & TIME: 3/24/25-3/25/25 [id number]    Cognitive Concerns/ Orientation : A&O x4   BEHAVIOR & AGGRESSION TOOL COLOR: Green  CIWA SCORE: NA   ABNL VS/O2: VSS on RA  MOBILITY: Up with assistance of 1 with gait belt and walker  PAIN MANAGMENT: Denies pain  DIET: Regular   BOWEL/BLADDER: Occasional incontinence of urine, continent of BM  ABNL LAB/BG: , WBC 28.1  DRAIN/DEVICES: LR infusing at 100 ml/hr  TELEMETRY RHYTHM: NA  SKIN: Has pressure injury of the coccyx area  D/C DATE:  TBD  Lung sounds have RLL Posterior expiratory wheezes, has frequent productive cough Pt using Incentive spirometer. Pt does not have CPAP here, offered to have respiratory to set up their equipment, Pt refused.Sticky note left for  for WOC consult for pressure injury of coccyx.

## 2025-03-25 NOTE — PLAN OF CARE
Goal Outcome Evaluation:      Plan of Care Reviewed With: other (see comments) ([facility] Nurse)          Outcome Evaluation: Care management will continue to follow patient for discharge needs.

## 2025-03-25 NOTE — CONSULTS
[facility] Nurse Inpatient Wound Assessment     Reason for consultation: Evaluate and treat Coccyx    Assessment  Wound : Intertrigo @ base of coccyx crease. Pt  and patient area heals and re-opens periodically. Use vaseline @ home that heals area. Area POA     Treatment Plan  Wound care: base of coccyx crease - intertrigo  -change dressing on odd days and prn   Clean area with soap and water. Pat dry  Apply Mepilex border to area     Orders  In epic   Recommended provider order: NA  [facility] Nurse follow-up plan: weekly   Nursing to notify the Provider(s) and re-consult the [facility] Nurse if wound(s) deteriorates or new skin concern.    Patient History  According to provider note(s):    Medicine Progress Note - Hospitalist Service     Date of Admission:  3/23/2025        Assessment & Plan  Darleen Fry is a 80 year old female with past medical history significant for asthma who presents with worsening cough, generalized weakness. Admitted on 3/23/2025.      Sepsis secondary multifocal community acquired pneumonia  Physical deconditioning   *Presented with weakness, worsening cough.  *CT chest compatible with multifocal pneumonia, greatest in RML.  *WBC 28.2 with left shift, T98.9F, HR 98, lactic acid normal.  *SpO2 stable on room air.  - Admitted to inpatient.  - Started on IV ceftriaxone and oral doxycycline in the ED, continue the same.  - Monitor O2 sats and use supplemental oxygen as needed.  - Blood cultures in process.  - Started scheduled mucinex DM.     Acute on chronic hyponatremia  Sodium 127, baseline 130-136. Previously worked up by primary care provider and suspected to have recent osmostat.  Likely acutely worsened due to poor intake.  - Received IV fluids overnight.  - BMP pending this morning, follow up on results.     Acute T9 vertebral fracture  Severe central canal stenosis, T7-T8, T8-T9   *Reports fall out of bed 3/20 with subsequent back and chest pain.  *CT  thoracic spine with significant findings as above, CT cervical and lumbar spine, CT chest without additional fractures.  - Neurosurgery consulted, appreciate their assistance.  - PT consulted, appreciate their assistance.  - Scheduled acetaminophen.  - Oxycodone PRN.     Chronic kidney disease, stage 2-3  Baseline creatinine 0.9-1.1. Creatinine 1.18 on admission.   - Currently around baseline.  - IVF as above.  - Avoid nephrotoxins.  - BMP pending this morning, follow up on results.     Hyperlipidemia  - Continue PTA lovastatin or formulary equivalent.     Asthma  No sign of acute exacerbation.  - Continue PTA Advair or formulary equivalent.       Objective Data  Containment of urine/stool: BPR to commode with assist.     Active Diet Order:  Orders Placed This Encounter      Combination Diet Regular Diet Adult    Output:   I/O last 3 completed shifts:  In: 520 [P.O.:520]  Out: -     Risk Assessment:   Sensory Perception: 4-->no impairment  Moisture: 4-->rarely moist  Activity: 3-->walks occasionally  Mobility: 3-->slightly limited  Nutrition: 3-->adequate  Friction and Shear: 2-->potential problem  Steven Score: 19                          Labs:   Recent Labs   Lab 03/24/25  1000 03/23/25  1714   ALBUMIN  --  3.7   HGB 11.2* 12.0   WBC 28.1* 28.2*       Physical Exam  Skin inspection: Backside    Wound Location:  distal coccyx crease   Date of last photo:  3/25-25    Measurements (length x width x depth, in cm):  1.0cm x .4cm x   .3cmm  Wound Base: 50% red/50% fibrin base  Tunneling: NA  Undermining: NA  Palpation of the wound bed: firm  Periwound skin: faint blanchable erythema  Temperature: warm   Drainage: none  Odor: none  Pain: Denies     Interventions  Current support surface  atmos air  Current off-loading measures: pillows  Visual inspection of wound(s) completed  Wound Care:   Supplies: Floor supply room and pt room   Education provided:   Discussed plan of care with Nursing     Melvina Salcedo RN, CWOCN

## 2025-03-25 NOTE — CONSULTS
"CM team called [facility], phone [phone] and Spoke with Maren who provided the following information:     In what kind of facility does pt reside?  Facility Type: Group Home - \"home discharge\"    Name of building/unit: [facility] Any steps to get in (#)? no  Address is [address] [place] [address] [place]. [address]              2.   Best number to reach facility nursing? Phone [phone]  Fax:           Evening/weekend number? [phone]    3.  What is the preferred return time for the resident?  anytime  Can patient return on weekend? yes       Do you know how they typically transport? Group Home can provide ride with stretcher or wheelchair.    4.   Does facility manage medications?   Yes If yes, contracted pharmacy? Name:   [facility] phone [phone]  Fax [phone]        If new Rx meds at discharge, fill at hospital pharmacy or contracted pharmacy?   Hospital    5.   What is pt's baseline cognition and mobility? Alert x 3, walks with walker,         What level of cognition/mobility is required for safe return? baseline    6.  Is resident enrolled in any \"services?\"    Yes, patient receives help with meals, bathing, medication management, laundry, housekeeping.     7.  Are \"skilled home health\" or \"on-site outpatient therapy services\" available there? No name/agency, if        known: open to agency coming into facility.    8.   Is the resident seen by PCP: off site   Name:    Justin Saravia [phone] [address] [address]     9.   Does pt have any personal DME  Yes, walker       Care Management Initial Consult    General Information  Assessment completed with: Care Team Member, -chart review (from [facility]),    Type of CM/SW Visit: Offer D/C Planning    Primary Care Provider verified and updated as needed:     Readmission within the last 30 days:        Reason for Consult: discharge planning  Advance Care Planning: Advance Care Planning Reviewed: no concerns identified "          Communication Assessment  Patient's communication style: spoken language (English or Bilingual)             Cognitive  Cognitive/Neuro/Behavioral: WDL                      Living Environment:   People in home: facility resident     Current living Arrangements: assisted living   Name of Facility: [facility]   Able to return to prior arrangements: yes       Family/Social Support:  Care provided by: other (see comments) (staff at facility)  Provides care for: no one, unable/limited ability to care for self     Support system: Facility resident(s)/Staff          Description of Support System: Supportive         Current Resources:   Patient receiving home care services: No        Community Resources:    Equipment currently used at home: none  Supplies currently used at home:  none   Incontinent supplies  Employment/Financial:  Employment Status:          Financial Concerns: none           Does the patient's insurance plan have a 3 day qualifying hospital stay waiver?  No    Lifestyle & Psychosocial Needs:  Social Drivers of Health     Food Insecurity: No Food Insecurity (3/22/2024)    Received from [facility]    Food Insecurity     Do you worry your food will run out before you are able to buy more?: 1   Depression: Not at risk (9/11/2024)    Received from [facility]    PHQ-2     PHQ-2 TOTAL SCORE: 0   Housing Stability: Low Risk  (3/22/2024)    Received from [facility]    Housing Stability     What is your housing situation today?: 1   Tobacco Use: Low Risk  (3/24/2025)    Patient History     Smoking Tobacco Use: Never     Smokeless Tobacco Use: Never     Passive Exposure: Not on file   Financial Resource Strain: Low Risk  (3/22/2024)    Received from [facility]    Financial Resource Strain     Difficulty of Paying Living Expenses: 3     Difficulty of Paying Living  Expenses: Not on file   Alcohol Use: Not on file   Transportation Needs: No Transportation Needs (3/22/2024)    Received from [facility]    Transportation Needs     Does lack of transportation keep you from medical appointments?: 1     Does lack of transportation keep you from work, meetings or getting things that you need?: 1   Physical Activity: Not on file   Interpersonal Safety: Not on file   Stress: Not on file   Social Connections: Socially Integrated (3/22/2024)    Received from [facility]    Social Connections     Do you often feel lonely or isolated from those around you?: 0   Health Literacy: Not on file       Functional Status:  Prior to admission patient needed assistance:   Dependent ADLs:: Toileting, Grooming, Eating, Dressing, Bathing, Ambulation-walker  Dependent IADLs:: Cleaning, Cooking, Laundry, Shopping, Meal Preparation, Medication Management, Transportation       Mental Health Status:  Mental Health Status: Current Concern  Mental Health Management: Psychiatrist    Chemical Dependency Status:  Chemical Dependency Status: No Current Concerns             Values/Beliefs:  Spiritual, Cultural Beliefs, Confucianism Practices, Values that affect care: no               Discussed  Partnership in Safe Discharge Planning  document with patient/family: No    Additional Information:  Consulted for discharge planning.  Called [facility] for to review patient and spoke with Maren. Maren  states that patient has times where she will throw herself onto the floor.  She is very worried about patient's safety at [facility].  She is concerned for her behavior at the [facility] and is hoping that patient will have a Psych Consult while in the hospital.  Patient can discharge to back to [facility] at discharge and they can provide transfer.    She also wanted writer to communicate that patient has had a previous injury to her ankle per her daughter.Patient has a CADI waiver and her   is [facility] phone [phone].  She also has a  through [facility] phone [phone].   Next Steps:  Care manager will continue to follow patient for discharge needs.

## 2025-03-25 NOTE — PROGRESS NOTES
[facility]    Medicine Progress Note - Hospitalist Service    Date of Admission:  3/23/2025    Assessment & Plan   Darleen Fry is a 80 year old female with past medical history significant for asthma who presents with worsening cough, generalized weakness. Admitted on 3/23/2025.     Sepsis secondary multifocal community acquired pneumonia  Physical deconditioning   *Presented with weakness, worsening cough.  *CT chest compatible with multifocal pneumonia, greatest in RML.  *WBC 28.2 with left shift, T98.9F, HR 98, lactic acid normal.  *SpO2 stable on room air.  - Admitted to inpatient.  - Started on IV ceftriaxone and oral doxycycline in the ED, continue the same.  - Monitor O2 sats and use supplemental oxygen as needed.  - Blood cultures in process, negative to date.  - Continue scheduled mucinex DM.  - Afebrile.  Not hypoxic.  Labs pending this morning.  - Continue inpatient care, likely another 1-2 days in the hospital pending improvement in symptoms/labs, progress with therapy.    Acute on chronic hyponatremia  Sodium 127, baseline 130-136. Previously worked up by primary care provider and suspected to have recent osmostat.  Likely acutely worsened due to poor intake.  - Received IV fluids overnight.  - Sodium improved to 130 on 3/24/25, BMP pending this morning, follow up on results.    Acute T9 vertebral fracture  Severe central canal stenosis, T7-T8, T8-T9   *Reports fall out of bed 3/20 with subsequent back and chest pain.  *CT thoracic spine with significant findings as above, CT cervical and lumbar spine, CT chest without additional fractures.  - Neurosurgery consulted, appreciate their assistance.   - Neurosurgery recommending conservative management, no further imaging or bracing necessary.  - Follow up in Neurosurgery clinic regarding thoracic stenosis.  - PT consulted, appreciate their assistance.  - Scheduled acetaminophen.  - Oxycodone PRN.    Chronic kidney disease, stage  2-3  Baseline creatinine 0.9-1.1. Creatinine 1.18 on admission.   - Currently around baseline.  - IVF as above.  - Avoid nephrotoxins.  - Creatinine improved to 0.93 on 3/24/25, labs pending on 3/25/25.    Hyperlipidemia  - Continue PTA lovastatin or formulary equivalent.    Asthma  No sign of acute exacerbation.  - Continue PTA Advair or formulary equivalent.          Diet: Combination Diet Regular Diet Adult    DVT Prophylaxis: Enoxaparin (Lovenox) SQ  Putnam Catheter: Not present  Lines: None     Cardiac Monitoring: None  Code Status: Full Code      Clinically Significant Risk Factors         # Hyponatremia: Lowest Na = 127 mmol/L in last 2 days, will monitor as appropriate  # Hypochloremia: Lowest Cl = 90 mmol/L in last 2 days, will monitor as appropriate                         # Asthma: noted on problem list        Social Drivers of Health            Disposition Plan     Medically Ready for Discharge: Anticipated in 2-4 Days, likely 1-2 more days in the hospital pending improvement in symptoms/labs, progress with therapy, appropriate discharge plan pending therapy evaluation.         Richie Turpin MD  Hospitalist Service  [facility]  Securely message with [employer] (more info)  Text page via [employer] Paging/Directory   ______________________________________________________________________    Interval History   Darleen SINGH Person was seen this morning.  She feels a little stronger today.  Cough about the same.  No fevers.  No shortness of breath at rest.  Continues to have some right sided rib pain and low back pain.  Anxious to work with therapy and increase mobility.  Plan of care discussed with bedside nurse.    Physical Exam   Vital Signs: Temp: 98.2  F (36.8  C) Temp src: Oral BP: (!) 151/65 Pulse: 81   Resp: 18 SpO2: 94 % O2 Device: None (Room air)    Weight: 123 lbs 0 oz    Constitutional: awake, alert, cooperative, no apparent distress, sitting up in a chair  Respiratory: no increased  work of breathing, clear to auscultation bilaterally, no crackles or wheezing  Cardiovascular: regular rate and rhythm, normal S1 and S2, no murmur noted  GI: normal bowel sounds, soft, non-distended, non-tender  Skin: warm dry, some scattered ecchymoses on right lower extremity  Musculoskeletal: no lower extremity pitting edema present  Neurologic: awake, alert, answers questions appropriately, moves all extremities    Medical Decision Making       40 MINUTES SPENT BY ME on the date of service doing chart review, history, exam, documentation & further activities per the note.      Data     I have personally reviewed the following data over the past 24 hrs:    28.1 (H)  \   11.2 (L)   / 281     130 (L) 92 (L) 24.4 (H) /  126 (H)   3.8 25 0.93 \

## 2025-03-25 NOTE — PROGRESS NOTES
"   03/25/25 0900   Appointment Info   Signing Clinician's Name / Credentials (PT) Jeb Wilson DPT       Present no   Living Environment   People in Home spouse   Current Living Arrangements house   Home Accessibility stairs to enter home;stairs within home   Number of Stairs, Main Entrance 1   Stair Railings, Main Entrance none   Number of Stairs, Within Home, Primary ten   Stair Railings, Within Home, Primary railing on right side (ascending)   Transportation Anticipated family or friend will provide   Living Environment Comments Pt lives in a house with her spouse. Stairs to enter and within the home. Pt reports her spouse will pick her up upon discharge and provide assist as needed.   Self-Care   Usual Activity Tolerance good   Current Activity Tolerance fair   Regular Exercise No   Equipment Currently Used at Home none   Fall history within last six months yes   Number of times patient has fallen within last six months 2   Activity/Exercise/Self-Care Comment Pt reports being IND with dressing at baseline, otherwise receives assist with ADLs from spouse. Pt ambulates w/o an AD at baseline but has a FWW. Walk-in shower with shower chair present.   General Information   Onset of Illness/Injury or Date of Surgery 03/25/25   Referring Physician Sven Lopez MD   Patient/Family Therapy Goals Statement (PT) \"To go home\"   Pertinent History of Current Problem (include personal factors and/or comorbidities that impact the POC) Per Chart: Darleen Fry is a 80 year old female with past medical history significant for asthma who presents with worsening cough, generalized weakness. Admitted on 3/23/2025.   Existing Precautions/Restrictions fall   Weight-Bearing Status - LLE full weight-bearing   Weight-Bearing Status - RLE full weight-bearing   Cognition   Orientation Status (Cognition) oriented x 3   Integumentary/Edema   Integumentary/Edema Comments Scattered bruising on BLEs   Posture  "   Posture Forward head position;Protracted shoulders   Range of Motion (ROM)   Range of Motion ROM is WFL   Strength (Manual Muscle Testing)   Strength (Manual Muscle Testing) Deficits observed during functional mobility;Able to perform L SLR;Able to perform R SLR   Strength Comments BLE Hip Flexion: 3+/5   Bed Mobility   Comment, (Bed Mobility) Did not assess   Transfers   Comment, (Transfers) Sit>stand w/ FWW and CGA   Gait/Stairs (Locomotion)   [place] Level (Gait) contact guard   Assistive Device (Gait) walker, front-wheeled   Distance in Feet (Gait) 5'   Balance   Balance Comments Adequate static sitting balance; pt using a FWW for OOB activity   Sensory Examination   Sensory Perception patient reports no sensory changes   Clinical Impression   Criteria for Skilled Therapeutic Intervention Yes, treatment indicated   PT Diagnosis (PT) Impaired gait   Influenced by the following impairments Decreased activity tolerance; decreased balance; decreased strength   Functional limitations due to impairments Impaired functional mobility   Clinical Presentation (PT Evaluation Complexity) stable   Clinical Presentation Rationale Clinical judgement   Clinical Decision Making (Complexity) low complexity   Planned Therapy Interventions (PT) balance training;bed mobility training;gait training;patient/family education;stair training;strengthening;transfer training;progressive activity/exercise   Risk & Benefits of therapy have been explained evaluation/treatment results reviewed;care plan/treatment goals reviewed;risks/benefits reviewed;current/potential barriers reviewed;participants voiced agreement with care plan;participants included;patient   PT Total Evaluation Time   PT Eval, Low Complexity Minutes (70156) 10   Physical Therapy Goals   PT Frequency Daily   PT Predicted Duration/Target Date for Goal Attainment 04/01/25   PT Goals Bed Mobility;Transfers;Stairs;Gait   PT: Bed Mobility Supervision/stand-by  assist;Supine to/from sit   PT: Transfers Supervision/stand-by assist;Sit to/from stand;Assistive device   PT: Gait Supervision/stand-by assist;Assistive device;100 feet   PT: Stairs Supervision/stand-by assist;10 stairs;Rail on right   Interventions   Interventions Quick Adds Gait Training;Therapeutic Activity   Therapeutic Activity   Therapeutic Activities: dynamic activities to improve functional performance Minutes (63591) 15   Symptoms Noted During/After Treatment Fatigue   Treatment Detail/Skilled Intervention Greeted pt sitting in chair, agreed to PT. VSS on RA throughout session. Increased time with all mobility as pt is anxious. Pt performed sit>stand x 3 w/ FWW and CGA, cues for hand placement. After ambulation, pt returned to chair w/ FWW and CGA, cues to descend in a slow, controlled motion. Additional time spent discussing discharge recommendations and receiving a FWW at discharge, pt in agreement. Pt ended session sitting in chair, with all needs met and call light within reach.   Gait Training   Gait Training Minutes (26427) 15   Symptoms Noted During/After Treatment (Gait Training) fatigue   Treatment Detail/Skilled Intervention Pt ambulated w/ FWW and CGA. Pt ambulated with decreased gait speed, downward gaze, decreased step length, and heavy reliance on FWW. Verbal cues for upright gaze and posture, to increase step length, to reduce reliance on FWW, and pacing. Good carryover with cues. Pt negotiated 8 stairs continuously using L rail and min A x 1. PT provided visual demo for safe stair negotiation. Pt negotiated with a step-to pattern. No LOB noted and pt tolerated activity well.   Distance in Feet 125'   PT Discharge Planning   PT Plan Assess bed mobility; repeat sit>stands; progress gait w/ FWW; review stairs as needed   PT Discharge Recommendation (DC Rec) home with home care physical therapy   PT Rationale for DC Rec Pt is below baseline. Pt currently requiring CGA for all functional  mobility. Pt presents with deficits in activity tolerance, balance, and strength. Due to these deficits, pt would benefit from continued skilled PT services via HHPT to address deficits and improve IND with safety and functional mobility. Pt's spouse will be present to assist pt as needed. Recommend pt use a FWW at discharge for improved stability and support.   PT Brief overview of current status Goals of therapy will be to address safe mobility and make recs for d/c to next level of care. Pt and RN will continue to follow all falls risk precautions as documented by RN staff while hospitalized. CGA w/ FWW, able to negotiate stairs   PT Total Distance Amb During Session (feet) 125   PT Equipment Needed at Discharge walker, rolling   Physical Therapy Time and Intention   Timed Code Treatment Minutes 30   Total Session Time (sum of timed and untimed services) 40

## 2025-03-26 ENCOUNTER — APPOINTMENT (OUTPATIENT)
Dept: PHYSICAL THERAPY | Facility: CLINIC | Age: 80
DRG: 871 | End: 2025-03-26
Payer: COMMERCIAL

## 2025-03-26 LAB — POTASSIUM SERPL-SCNC: 4.4 MMOL/L (ref 3.4–5.3)

## 2025-03-26 PROCEDURE — 250N000013 HC RX MED GY IP 250 OP 250 PS 637: Performed by: INTERNAL MEDICINE

## 2025-03-26 PROCEDURE — 97530 THERAPEUTIC ACTIVITIES: CPT | Mod: GP | Performed by: PHYSICAL THERAPIST

## 2025-03-26 PROCEDURE — 120N000001 HC R&B MED SURG/OB

## 2025-03-26 PROCEDURE — 250N000013 HC RX MED GY IP 250 OP 250 PS 637: Performed by: HOSPITALIST

## 2025-03-26 PROCEDURE — 258N000003 HC RX IP 258 OP 636: Performed by: INTERNAL MEDICINE

## 2025-03-26 PROCEDURE — 97116 GAIT TRAINING THERAPY: CPT | Mod: GP | Performed by: PHYSICAL THERAPIST

## 2025-03-26 PROCEDURE — 36415 COLL VENOUS BLD VENIPUNCTURE: CPT | Performed by: INTERNAL MEDICINE

## 2025-03-26 PROCEDURE — 84132 ASSAY OF SERUM POTASSIUM: CPT | Performed by: INTERNAL MEDICINE

## 2025-03-26 PROCEDURE — 250N000011 HC RX IP 250 OP 636: Performed by: INTERNAL MEDICINE

## 2025-03-26 PROCEDURE — 99232 SBSQ HOSP IP/OBS MODERATE 35: CPT | Performed by: HOSPITALIST

## 2025-03-26 RX ORDER — GUAIFENESIN/DEXTROMETHORPHAN 100-10MG/5
10 SYRUP ORAL EVERY 4 HOURS PRN
Status: DISCONTINUED | OUTPATIENT
Start: 2025-03-26 | End: 2025-03-28 | Stop reason: HOSPADM

## 2025-03-26 RX ADMIN — ACETAMINOPHEN 975 MG: 325 TABLET, FILM COATED ORAL at 17:07

## 2025-03-26 RX ADMIN — ALBUTEROL SULFATE 2 PUFF: 108 INHALANT RESPIRATORY (INHALATION) at 04:44

## 2025-03-26 RX ADMIN — ACETAMINOPHEN 975 MG: 325 TABLET, FILM COATED ORAL at 08:56

## 2025-03-26 RX ADMIN — ATORVASTATIN CALCIUM 10 MG: 10 TABLET, FILM COATED ORAL at 08:56

## 2025-03-26 RX ADMIN — DOXYCYCLINE HYCLATE 100 MG: 100 CAPSULE ORAL at 21:23

## 2025-03-26 RX ADMIN — GUAIFENESIN AND DEXTROMETHORPHAN HYDROBROMIDE 1 TABLET: 600; 30 TABLET, EXTENDED RELEASE ORAL at 08:56

## 2025-03-26 RX ADMIN — ENOXAPARIN SODIUM 40 MG: 40 INJECTION SUBCUTANEOUS at 08:57

## 2025-03-26 RX ADMIN — CEFTRIAXONE SODIUM 2 G: 2 INJECTION, POWDER, FOR SOLUTION INTRAMUSCULAR; INTRAVENOUS at 21:23

## 2025-03-26 RX ADMIN — DOXYCYCLINE HYCLATE 100 MG: 100 CAPSULE ORAL at 08:56

## 2025-03-26 RX ADMIN — SODIUM CHLORIDE, SODIUM LACTATE, POTASSIUM CHLORIDE, AND CALCIUM CHLORIDE: .6; .31; .03; .02 INJECTION, SOLUTION INTRAVENOUS at 07:05

## 2025-03-26 RX ADMIN — ACETAMINOPHEN 975 MG: 325 TABLET, FILM COATED ORAL at 21:23

## 2025-03-26 RX ADMIN — FLUTICASONE FUROATE AND VILANTEROL TRIFENATATE 1 PUFF: 100; 25 POWDER RESPIRATORY (INHALATION) at 09:06

## 2025-03-26 ASSESSMENT — ACTIVITIES OF DAILY LIVING (ADL)
ADLS_ACUITY_SCORE: 66
ADLS_ACUITY_SCORE: 66
ADLS_ACUITY_SCORE: 68
ADLS_ACUITY_SCORE: 66
ADLS_ACUITY_SCORE: 66
ADLS_ACUITY_SCORE: 68
ADLS_ACUITY_SCORE: 65
ADLS_ACUITY_SCORE: 65
ADLS_ACUITY_SCORE: 66
ADLS_ACUITY_SCORE: 68
ADLS_ACUITY_SCORE: 65
ADLS_ACUITY_SCORE: 68
ADLS_ACUITY_SCORE: 66
ADLS_ACUITY_SCORE: 65
ADLS_ACUITY_SCORE: 66
ADLS_ACUITY_SCORE: 66
ADLS_ACUITY_SCORE: 68
ADLS_ACUITY_SCORE: 66
ADLS_ACUITY_SCORE: 68

## 2025-03-26 NOTE — PROGRESS NOTES
Admitting Diagnosis: Falls frequently [R29.6]  Closed fracture of thoracic vertebral body (H) [S22.009A]  Multifocal pneumonia [J18.9]  Sepsis without acute organ dysfunction, due to unspecified organism (H) [A41.9]   Vitals VSS  Pain 0/10. Taking n/a PRN. Last dose n/a  A&Ox4  Voiding Yes  Mobility Assist of 1 GB/W  Tele n/a  CMS Intact  Lung Sounds diminished on RA via n/a. Productive coughs, was encouraged to use the IS.  GI Active. 1 BM today.  Dressing n/a     Orders Placed This Encounter      Combination Diet Regular Diet Adult  On LR @100 ml/hr

## 2025-03-26 NOTE — PROGRESS NOTES
[facility]    Medicine Progress Note - Hospitalist Service    Date of Admission:  3/23/2025    Assessment & Plan   Darleen Fry is a 80 year old female with past medical history significant for asthma who presented with worsening cough, generalized weakness. Admitted on 3/23/2025.      Sepsis secondary multifocal community acquired pneumonia  Weakness and deconditioning   *Presented with weakness, worsening cough.  *CT chest compatible with multifocal pneumonia, greatest in RML.  *WBC 28.2 with left shift, T98.9F, HR 98, lactic acid normal.  *SpO2 stable on room air.    - Started on IV ceftriaxone and oral doxycycline in the ED, possible transition to oral antibiotics next 24 to 48 hours  - Monitor O2 sats and use supplemental oxygen as needed, encourage spirometry  - Blood cultures 3/23, negative to date.  - Continue scheduled Mucinex DM.  - Afebrile.  Not hypoxic on room air. Leukocytosis resolving.  - Encourage oral fluid intake; IV fluids discontinued 3/26  - Increase activity as tolerated; physical therapy consulted  - AM labs with  CBC     Acute on chronic hyponatremia  Sodium 127, baseline 130-136. Previously worked up by primary care provider and suspected to have recent osmostat.  Likely acutely worsened due to poor intake.  - Received IV fluids inpatient, later discontinued  - Sodium improved to 130 on 3/24/25, to 132 on 3/25  - AM basic profile  Recent Labs   Lab 03/25/25  1541 03/24/25  1000 03/23/25  1714   * 130* 127*      Acute T9 vertebral fracture  Severe central canal stenosis, T7-T8, T8-T9   *Reports fall out of bed 3/20 with subsequent back and chest pain.  *CT thoracic spine with significant findings as above, CT cervical and lumbar spine, CT chest without additional fractures.  - Neurosurgery consulted, appreciate their assistance.              - Neurosurgery recommending conservative management, no further imaging or bracing necessary.  - Follow up in  Neurosurgery clinic regarding thoracic stenosis.  - PT consulted as above  - Pain control, see hospital orders     Chronic kidney disease, stage 2-3  Baseline creatinine 0.9-1.1. Creatinine 1.18 on admission.   - Cr currently around baseline.  - IVF as above.  - Avoid nephrotoxins and non-steroidal anti-inflammatory drugs (NSAIDs)   Recent Labs   Lab 03/25/25  1541 03/24/25  1000 03/23/25  1714   CR 0.85 0.93 1.18*      Hyperlipidemia  - Continue PTA lovastatin or formulary equivalent.     Asthma  No sign of acute exacerbation.  - Continue PTA Advair or formulary equivalent.  - Treatment for pneumonia as above    Urinary incontinence, chronic  Longstanding issues, exacerbated by IV fluid administration in hospital and acute illness.  Continue to monitor, follow-up with outpatient clinic provider and/or urology.    Disposition  Anticipated discharge timing see Disposition Plan below and Medically Ready for Discharge link  Anticipated discharge to home with   Social work consulted for discharge planning  Patient's , Pedro, called with update 3/26 per patient request; appreciative of call          Diet: Combination Diet Regular Diet Adult    DVT Prophylaxis: Enoxaparin (Lovenox) SQ  Putnam Catheter: Not present  Lines: None     Cardiac Monitoring: None  Code Status: Full Code      Clinically Significant Risk Factors         # Hyponatremia: Lowest Na = 132 mmol/L in last 2 days, will monitor as appropriate  # Hypochloremia: Lowest Cl = 96 mmol/L in last 2 days, will monitor as appropriate                         # Financial/Environmental Concerns: none  # Asthma: noted on problem list        Social Drivers of Health            Disposition Plan     Medically Ready for Discharge: Anticipated Tomorrow             Kirk Chan MD  Hospitalist Service  [facility]  Securely message with [employer] (more info)  Text page via [employer] Paging/Directory  "  ______________________________________________________________________    Interval History   First visit with patient today, sitting up in bed, eating breakfast.  Intermittent coughing reported.  Overalll \"feeling stronger\". Appetite stable.  Ongoing treatment for pneumonia as well as recent vertebral fracture.  No new symptoms reported other than longstanding urinary incontinence.  Remains weak and deconditioned.  Nursing staff at the bedside and care plan discussed.  Patient's  called at home with update per patient request.    Physical Exam   Vital Signs: Temp: 98.9  F (37.2  C) Temp src: Oral BP: 138/65 Pulse: 78   Resp: 16 SpO2: 94 % O2 Device: None (Room air)    Weight: 123 lbs 0 oz    GENERAL awake and alert, no acute distress, pleasant and interactive   LUNGS no wheezes or crackles  HEART S1, S2 with RRR  ABDOMEN soft, nontender  EXTREMITIES without significant edema  SKIN warm and dry  NEURO moves upper and lower extremities spontaneously and to command  MENTAL STATUS answering questions and following simple commands    Medical Decision Making             Data     I have personally reviewed the following data over the past 24 hrs:    16.4 (H)  \   11.2 (L)   / 337     132 (L) 96 (L) 25.9 (H) /  91   4.3 28 0.85 \       Imaging results reviewed over the past 24 hrs:   No results found for this or any previous visit (from the past 24 hours).    "

## 2025-03-26 NOTE — PLAN OF CARE
Goal Outcome Evaluation:       DATE & TIME: 3/25/25-3/26/25 [id number]    Cognitive Concerns/ Orientation : A&O x4   BEHAVIOR & AGGRESSION TOOL COLOR: Green  CIWA SCORE: NA   ABNL VS/O2: VSS on RA  MOBILITY: Up with assistance of 1 with gait belt and walker  PAIN MANAGMENT: Denies pain  DIET: Regular  BOWEL/BLADDER: Occasional incontinence of urine, Continent of BM  ABNL LAB/BG: , WBC 16.4  DRAIN/DEVICES: LR infusing at 100 ml/hr  TELEMETRY RHYTHM: NA  SKIN: Scattered bruises and wound of coccyx, Meplex intact, Wound care and dressing changes on ODD days  D/C DATE: TBD, Referrals sent for Home Care PT  Lung sounds are clear, Frequent Productive cough , pt states clear-yellow-green sputum. Refuses CPAP. Pt using Incentive Spirometer per self. PRN Alberterol given x1 for SOB after ambulating to the bathroom

## 2025-03-27 ENCOUNTER — APPOINTMENT (OUTPATIENT)
Dept: PHYSICAL THERAPY | Facility: CLINIC | Age: 80
DRG: 871 | End: 2025-03-27
Payer: COMMERCIAL

## 2025-03-27 VITALS
SYSTOLIC BLOOD PRESSURE: 124 MMHG | BODY MASS INDEX: 21.25 KG/M2 | OXYGEN SATURATION: 96 % | WEIGHT: 124.5 LBS | RESPIRATION RATE: 18 BRPM | TEMPERATURE: 98.2 F | HEIGHT: 64 IN | HEART RATE: 80 BPM | DIASTOLIC BLOOD PRESSURE: 62 MMHG

## 2025-03-27 LAB
ANION GAP SERPL CALCULATED.3IONS-SCNC: 9 MMOL/L (ref 7–15)
BACTERIA BLD CULT: NORMAL
BACTERIA BLD CULT: NORMAL
BUN SERPL-MCNC: 25.6 MG/DL (ref 8–23)
CALCIUM SERPL-MCNC: 9.2 MG/DL (ref 8.8–10.4)
CHLORIDE SERPL-SCNC: 98 MMOL/L (ref 98–107)
CREAT SERPL-MCNC: 0.74 MG/DL (ref 0.51–0.95)
EGFRCR SERPLBLD CKD-EPI 2021: 81 ML/MIN/1.73M2
ERYTHROCYTE [DISTWIDTH] IN BLOOD BY AUTOMATED COUNT: 14.1 % (ref 10–15)
GLUCOSE SERPL-MCNC: 120 MG/DL (ref 70–99)
HCO3 SERPL-SCNC: 29 MMOL/L (ref 22–29)
HCT VFR BLD AUTO: 31.2 % (ref 35–47)
HGB BLD-MCNC: 10.8 G/DL (ref 11.7–15.7)
MCH RBC QN AUTO: 32 PG (ref 26.5–33)
MCHC RBC AUTO-ENTMCNC: 34.6 G/DL (ref 31.5–36.5)
MCV RBC AUTO: 93 FL (ref 78–100)
PLATELET # BLD AUTO: 372 10E3/UL (ref 150–450)
POTASSIUM SERPL-SCNC: 4.4 MMOL/L (ref 3.4–5.3)
RBC # BLD AUTO: 3.37 10E6/UL (ref 3.8–5.2)
SODIUM SERPL-SCNC: 136 MMOL/L (ref 135–145)
WBC # BLD AUTO: 12.8 10E3/UL (ref 4–11)

## 2025-03-27 PROCEDURE — 250N000011 HC RX IP 250 OP 636: Performed by: INTERNAL MEDICINE

## 2025-03-27 PROCEDURE — 85018 HEMOGLOBIN: CPT | Performed by: HOSPITALIST

## 2025-03-27 PROCEDURE — 120N000001 HC R&B MED SURG/OB

## 2025-03-27 PROCEDURE — 250N000013 HC RX MED GY IP 250 OP 250 PS 637

## 2025-03-27 PROCEDURE — 250N000013 HC RX MED GY IP 250 OP 250 PS 637: Performed by: INTERNAL MEDICINE

## 2025-03-27 PROCEDURE — 80048 BASIC METABOLIC PNL TOTAL CA: CPT | Performed by: HOSPITALIST

## 2025-03-27 PROCEDURE — 97116 GAIT TRAINING THERAPY: CPT | Mod: GP | Performed by: PHYSICAL THERAPIST

## 2025-03-27 PROCEDURE — 36415 COLL VENOUS BLD VENIPUNCTURE: CPT | Performed by: HOSPITALIST

## 2025-03-27 PROCEDURE — 99232 SBSQ HOSP IP/OBS MODERATE 35: CPT | Performed by: HOSPITALIST

## 2025-03-27 PROCEDURE — 250N000013 HC RX MED GY IP 250 OP 250 PS 637: Performed by: HOSPITALIST

## 2025-03-27 PROCEDURE — 97530 THERAPEUTIC ACTIVITIES: CPT | Mod: GP | Performed by: PHYSICAL THERAPIST

## 2025-03-27 RX ORDER — DOXYCYCLINE HYCLATE 20 MG
100 TABLET ORAL EVERY 12 HOURS SCHEDULED
Status: COMPLETED | OUTPATIENT
Start: 2025-03-27 | End: 2025-03-28

## 2025-03-27 RX ADMIN — CEFTRIAXONE SODIUM 2 G: 2 INJECTION, POWDER, FOR SOLUTION INTRAMUSCULAR; INTRAVENOUS at 21:43

## 2025-03-27 RX ADMIN — FLUTICASONE FUROATE AND VILANTEROL TRIFENATATE 1 PUFF: 100; 25 POWDER RESPIRATORY (INHALATION) at 10:02

## 2025-03-27 RX ADMIN — DOXYCYCLINE HYCLATE 100 MG: 20 TABLET ORAL at 21:43

## 2025-03-27 RX ADMIN — ENOXAPARIN SODIUM 40 MG: 40 INJECTION SUBCUTANEOUS at 10:02

## 2025-03-27 RX ADMIN — ACETAMINOPHEN 975 MG: 325 TABLET, FILM COATED ORAL at 10:02

## 2025-03-27 RX ADMIN — ACETAMINOPHEN 975 MG: 325 TABLET, FILM COATED ORAL at 21:42

## 2025-03-27 RX ADMIN — GUAIFENESIN AND DEXTROMETHORPHAN 10 ML: 100; 10 SYRUP ORAL at 02:00

## 2025-03-27 RX ADMIN — ATORVASTATIN CALCIUM 10 MG: 10 TABLET, FILM COATED ORAL at 10:02

## 2025-03-27 RX ADMIN — DOXYCYCLINE HYCLATE 100 MG: 100 CAPSULE ORAL at 10:02

## 2025-03-27 ASSESSMENT — ACTIVITIES OF DAILY LIVING (ADL)
ADLS_ACUITY_SCORE: 66
ADLS_ACUITY_SCORE: 66
ADLS_ACUITY_SCORE: 47
ADLS_ACUITY_SCORE: 78
ADLS_ACUITY_SCORE: 78
ADLS_ACUITY_SCORE: 66
ADLS_ACUITY_SCORE: 66
ADLS_ACUITY_SCORE: 73
ADLS_ACUITY_SCORE: 47
ADLS_ACUITY_SCORE: 73
ADLS_ACUITY_SCORE: 66
ADLS_ACUITY_SCORE: 74
ADLS_ACUITY_SCORE: 74
ADLS_ACUITY_SCORE: 47
ADLS_ACUITY_SCORE: 78
ADLS_ACUITY_SCORE: 47
ADLS_ACUITY_SCORE: 69
ADLS_ACUITY_SCORE: 70
ADLS_ACUITY_SCORE: 69
ADLS_ACUITY_SCORE: 66
ADLS_ACUITY_SCORE: 47
ADLS_ACUITY_SCORE: 47
ADLS_ACUITY_SCORE: 66

## 2025-03-27 NOTE — PROGRESS NOTES
Care Management Follow Up    Length of Stay (days): 4    Expected Discharge Date: 03/27/2025     Concerns to be Addressed: discharge planning     Patient plan of care discussed at interdisciplinary rounds: Yes    Anticipated Discharge Disposition: Home Care, Home              Anticipated Discharge Services:  (f)  Anticipated Discharge DME:      Patient/family educated on Medicare website which has current facility and service quality ratings:    Education Provided on the Discharge Plan:    Patient/Family in Agreement with the Plan:      Referrals Placed by CM/SW: Homecare  Private pay costs discussed: Not applicable    Discussed  Partnership in Safe Discharge Planning  document with patient/family: No     Handoff Completed: No, handoff not indicated or clinically appropriate    Additional Information:  Informed patient and  that she was accepted by [facility]. For RN/PT visits.    Pt will discharge tomorrow.   Next Steps: Continue to follow for any discharge needs.      Ester Gonsalez, Care Management RN, [facility] - FLO   Contact: via [employer]

## 2025-03-27 NOTE — PROGRESS NOTES
Admitting Diagnosis: Falls frequently [R29.6]  Closed fracture of thoracic vertebral body (H) [S22.009A]  Multifocal pneumonia [J18.9]  Sepsis without acute organ dysfunction, due to unspecified organism (H) [A41.9]   Vitals VSS  Pain 0/10. Taking n/a PRN. Last dose n/a  A&Ox4  Voiding Yes  Mobility Assist of 1 GB/W  Tele n/a  CMS Intact  Lung Sounds diminished on RA via n/a. Productive coughs, was encouraged to use the IS.  GI Active. 1 BM today.  Dressing n/a     Orders Placed This Encounter      Combination Diet Regular Diet Adult

## 2025-03-27 NOTE — PLAN OF CARE
Goal Outcome Evaluation:  Pt is Aox3-4 with intermitten confusion, A1-2 GBW, several Bms this shift with incontinence. Denies pain. Takes medications crushed in applesauce. Discharge home tomorrow.

## 2025-03-27 NOTE — PROVIDER NOTIFICATION
MD Notification    Notified Person: MD    Notified Person Name: DIALLO Miller MD    Notification Date/Time: 3/26  2200    Notification Interaction: vocera msg    Purpose of Notification: pt unable to swallow mucinex DM whole. Unable to crush med. Change to liquid??    Orders Received:     Comments:

## 2025-03-27 NOTE — PROGRESS NOTES
[facility]    Medicine Progress Note - Hospitalist Service    Date of Admission:  3/23/2025    Assessment & Plan   Darleen Fry is a 80 year old female with past medical history of asthma, chronic kidney disease (CKD), chronic hyponatremia, hyperlipidemia, urinary incontinence, who presented with worsening cough, generalized weakness on 3/23/2025, diagnosed with pneumonia.      Sepsis secondary multifocal community acquired pneumonia  Weakness and deconditioning   *Presented with weakness, worsening cough.  *CT chest compatible with multifocal pneumonia, greatest in RML.  *WBC 28.2 with left shift, T98.9F, HR 98, lactic acid normal.  *SpO2 stable on room air.    - Continue IV ceftriaxone and oral doxycycline (started 3/23), with possible transition to oral antibiotics next 24 hours  - Monitor O2 sats and use supplemental oxygen as needed, encourage spirometry  - Blood cultures 3/23, negative to date  - Afebrile. Not hypoxic on room air. Leukocytosis resolving.  - Encourage oral fluid intake; IV fluids discontinued 3/26  - Increase activity as tolerated; physical therapy consulted  - AM labs reviewed with decreasing WBC, normal serum sodium     Acute on chronic hyponatremia  Sodium 127, baseline 130-136. Previously worked up by primary care provider and suspected to have recent osmostat.  Likely acutely worsened due to poor intake.  - Received IV fluids inpatient, later discontinued  - Sodium improved to 130 on 3/24/25, to 132 on 3/25; normal at 136 on 3/27  Recent Labs   Lab 03/27/25  1121 03/25/25  1541 03/24/25  1000 03/23/25  1714    132* 130* 127*      Acute T9 vertebral fracture  Severe central canal stenosis, T7-T8, T8-T9   *Reports fall out of bed 3/20 with subsequent back and chest pain.  *CT thoracic spine with significant findings as above, CT cervical and lumbar spine, CT chest without additional fractures.  - Neurosurgery consulted, appreciate their assistance.               - Neurosurgery recommending conservative management, no further imaging or bracing necessary.  - Follow up in Neurosurgery clinic regarding thoracic stenosis.  - PT consulted as above  - Pain control, see hospital orders     Chronic kidney disease, stage 2-3  Baseline creatinine 0.9-1.1. Creatinine 1.18 on admission.   - Cr currently around baseline.  - IVF as above.  - Avoid nephrotoxins and non-steroidal anti-inflammatory drugs (NSAIDs)   Recent Labs   Lab 03/27/25  1121 03/25/25  1541 03/24/25  1000 03/23/25  1714   CR 0.74 0.85 0.93 1.18*      Hyperlipidemia  - Continue PTA lovastatin or formulary equivalent.     Asthma  No sign of acute exacerbation.  - Continue PTA Advair or formulary equivalent.  - Treatment for pneumonia as above    Urinary incontinence, chronic  Longstanding issues, exacerbated by IV fluid administration in hospital and acute illness.  Continue to monitor, follow-up with outpatient clinic provider and/or urology.    Disposition  Anticipated discharge timing see Disposition Plan below and Medically Ready for Discharge link  Anticipated discharge to home with   Social work consulted for discharge planning  Patient's , Pedro, called with update 3/26, 3/27          Diet: Combination Diet Regular Diet Adult    DVT Prophylaxis: Enoxaparin (Lovenox) SQ  Putnam Catheter: Not present  Lines: None     Cardiac Monitoring: None  Code Status: Full Code      Clinically Significant Risk Factors         # Hyponatremia: Lowest Na = 132 mmol/L in last 2 days, will monitor as appropriate  # Hypochloremia: Lowest Cl = 96 mmol/L in last 2 days, will monitor as appropriate                         # Financial/Environmental Concerns: none  # Asthma: noted on problem list        Social Drivers of Health            Disposition Plan     Medically Ready for Discharge: Anticipated Tomorrow             Kirk Chan MD  Hospitalist Service  [facility]  Securely message  with [employer] (more info)  Text page via [facility] Paging/Directory   ______________________________________________________________________    Interval History   Pleasant and interactive, mildly anxious regarding current progress and medical problems.  Nursing staff at the bedside and care plan discussed.  No increase shortness of breath reported.  Continues on IV antibiotics for pneumonia.  Pain control appears satisfactory.  Progressing with therapy.  Possible discharge home in the next 24 hours.    Physical Exam   Vital Signs: Temp: 98.7  F (37.1  C) Temp src: Oral BP: (!) 153/74 Pulse: 77   Resp: 18 SpO2: 95 % O2 Device: None (Room air)    Weight: 124 lbs 8 oz    GENERAL awake and alert, no acute distress, pleasant and interactive   LUNGS no wheezes or crackles  HEART S1, S2 with RRR  ABDOMEN soft, nontender  EXTREMITIES without significant edema  SKIN warm and dry  NEURO moves upper and lower extremities spontaneously and to command  MENTAL STATUS answering questions and following simple commands    Medical Decision Making             Data     I have personally reviewed the following data over the past 24 hrs:    12.8 (H)  \   10.8 (L)   / 372     136 98 25.6 (H) /  120 (H)   4.4 29 0.74 \       Imaging results reviewed over the past 24 hrs:   No results found for this or any previous visit (from the past 24 hours).

## 2025-03-27 NOTE — PROVIDER NOTIFICATION
Spoke with Dr. Chan, patient has doxycycline ordered. Per patient they have been crushing the medication and putting it in the applesauce. Writer explained to patient that you can not crush a capsule so the pill would need to be taken whole or changed to accomodate the crushing of the medication. Dr. Chan will look into and update orders as needed.     Dr. Chan changed doxycycline orders to pill form.

## 2025-03-28 ENCOUNTER — APPOINTMENT (OUTPATIENT)
Dept: PHYSICAL THERAPY | Facility: CLINIC | Age: 80
DRG: 871 | End: 2025-03-28
Payer: COMMERCIAL

## 2025-03-28 VITALS
BODY MASS INDEX: 21.25 KG/M2 | OXYGEN SATURATION: 95 % | TEMPERATURE: 98.1 F | SYSTOLIC BLOOD PRESSURE: 150 MMHG | HEART RATE: 88 BPM | WEIGHT: 124.5 LBS | RESPIRATION RATE: 18 BRPM | HEIGHT: 64 IN | DIASTOLIC BLOOD PRESSURE: 76 MMHG

## 2025-03-28 LAB
BACTERIA BLD CULT: NO GROWTH
BACTERIA BLD CULT: NO GROWTH

## 2025-03-28 PROCEDURE — 250N000013 HC RX MED GY IP 250 OP 250 PS 637: Performed by: INTERNAL MEDICINE

## 2025-03-28 PROCEDURE — 97530 THERAPEUTIC ACTIVITIES: CPT | Mod: GP

## 2025-03-28 PROCEDURE — 250N000013 HC RX MED GY IP 250 OP 250 PS 637: Performed by: HOSPITALIST

## 2025-03-28 PROCEDURE — 97110 THERAPEUTIC EXERCISES: CPT | Mod: GP

## 2025-03-28 PROCEDURE — 97116 GAIT TRAINING THERAPY: CPT | Mod: GP

## 2025-03-28 PROCEDURE — 250N000011 HC RX IP 250 OP 636: Performed by: INTERNAL MEDICINE

## 2025-03-28 PROCEDURE — 99239 HOSP IP/OBS DSCHRG MGMT >30: CPT | Performed by: HOSPITALIST

## 2025-03-28 RX ORDER — GUAIFENESIN/DEXTROMETHORPHAN 100-10MG/5
10 SYRUP ORAL 4 TIMES DAILY PRN
COMMUNITY
Start: 2025-03-28

## 2025-03-28 RX ORDER — ACETAMINOPHEN 325 MG/1
975 TABLET ORAL 3 TIMES DAILY PRN
COMMUNITY
Start: 2025-03-28

## 2025-03-28 RX ADMIN — DOXYCYCLINE HYCLATE 100 MG: 20 TABLET ORAL at 08:37

## 2025-03-28 RX ADMIN — ENOXAPARIN SODIUM 40 MG: 40 INJECTION SUBCUTANEOUS at 08:37

## 2025-03-28 RX ADMIN — ATORVASTATIN CALCIUM 10 MG: 10 TABLET, FILM COATED ORAL at 08:37

## 2025-03-28 ASSESSMENT — ACTIVITIES OF DAILY LIVING (ADL)
ADLS_ACUITY_SCORE: 47
ADLS_ACUITY_SCORE: 48
ADLS_ACUITY_SCORE: 48
ADLS_ACUITY_SCORE: 47
ADLS_ACUITY_SCORE: 48
ADLS_ACUITY_SCORE: 47
ADLS_ACUITY_SCORE: 48
ADLS_ACUITY_SCORE: 47
ADLS_ACUITY_SCORE: 47

## 2025-03-28 NOTE — PLAN OF CARE
Goal Outcome Evaluation:  Pt is discharging home with  this afternoon. Discharge instructions and medications reviewed with patient, questions answered and verbalized understanding. IV removed and patient belongings accounted for at discharge.

## 2025-03-28 NOTE — PROVIDER NOTIFICATION
Paged Dr. Chan, patient is scheduled to see PT this morning does she need to before she discharges? Thanks     Please have PT see patient if they are able to before discharge.

## 2025-03-28 NOTE — DISCHARGE SUMMARY
[facility]  Hospitalist Discharge Summary      Date of Admission:  3/23/2025  Date of Discharge:  3/28/2025  Discharging Provider: Kirk Chan MD  Discharge Service: Hospitalist Service    Discharge Diagnoses   Sepsis secondary multifocal community acquired pneumonia - follow-up imaging recommended to confirm resolution with primary clinic provider   Small, bilateral pleural effusions, R>L, likely reactive mediastinal lymphadenopathy  Weakness and deconditioning, home PT on discharge  Acute on chronic hyponatremia, resolved  Acute T9 vertebral fracture - follow-up with neurosurgery  Severe central canal stenosis, T7-T8, T8-T9 - follow-up with neurosurgery  Chronic kidney disease, 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 neurosurgery clinic in 4 to 6 weeks regarding thoracic stenosis.  Please call our schedulers at [phone] to schedule your appointment or for any questions or concerns.        Hospital Follow-up with Existing Primary Care Provider (PCP)      Please see details below         Schedule Primary Care visit within: 7 Days   Recommended labs and Imaging (to be ordered by Primary Care Provider): CBC and basic profile at time of visit in one week; chest x-ray in 3 to 4 weeks to confirm resolution of pneumonia, ordered and reviewed by your clinic provider               Unresulted Labs Ordered in the Past 30 Days of this Admission       Date and Time Order Name Status Description    3/23/2025  8:56 PM Blood Culture Peripheral Blood Preliminary     3/23/2025  8:56 PM Blood Culture Peripheral Blood Preliminary         These results will be followed up by primary clinic provider      Discharge Disposition   Discharged to home  Condition at discharge: Stable    Hospital Course   Darleen Fry is a 80 year old female with past medical history of asthma, chronic kidney disease (CKD), chronic hyponatremia, hyperlipidemia, urinary incontinence, who presented with worsening cough, generalized weakness on 3/23/2025, diagnosed with pneumonia. For details see admission note.      Sepsis secondary multifocal community acquired pneumonia  Small, bilateral pleural effusions, R>L, likely reactive mediastinal lymphadenopathy  Weakness and deconditioning   *Presented with weakness, worsening cough.  *CT chest compatible with multifocal pneumonia, greatest in RML.  *WBC 28.2 with left shift, T98.9F, HR 98, lactic acid normal.  *SpO2 stable on room air.    - Competed 5 day course of IV ceftriaxone and oral doxycycline  - follow-up chest x-ray in 3 to 4 weeks with primary clinic provider recommended to confirm resolution of pneumonia  - Blood cultures 3/23, negative to date  - Afebrile. Not hypoxic on room air. Leukocytosis resolving. Follow-up labs with primary clinic provider is recommended   - Encourage oral fluid intake; IV fluids discontinued 3/26  - Increase activity as tolerated; physical therapy consulted, home care on discharge with PT     Acute on chronic hyponatremia, resolved  Sodium 127, baseline 130-136. Previously worked up by primary care provider and suspected to have recent osmostat.  Likely acutely worsened due to poor intake.  - Received IV fluids inpatient, later discontinued  - Sodium improved to 130 on 3/24/25, to 132 on 3/25; normal at 136 on 3/27  - follow-up labs with primary clinic provider is recommended at upcoming visit  Recent Labs   Lab 03/27/25  1121 03/25/25  1541 03/24/25  1000 03/23/25  1714    132* 130* 127*     Acute T9 vertebral fracture  Severe central canal stenosis, T7-T8, T8-T9   *Reports fall out of bed 3/20 with subsequent back and chest pain.  *CT thoracic  spine with significant findings as above, CT cervical and lumbar spine, CT chest without additional fractures.  - Neurosurgery consulted, appreciate their assistance.              - Neurosurgery recommending conservative management, no further imaging or bracing necessary.  - Follow up in Neurosurgery clinic regarding thoracic stenosis and T9 fracture, in 4 to 6 weeks; patient to call for appointment  - PT consulted as above, home care on discharge  - Pain control, see hospital orders, acetaminophen (Tylenol) prn      Chronic kidney disease, stage 2-3  Baseline creatinine 0.9-1.1. Creatinine 1.18 on admission.   - Cr currently around baseline.  - Avoid nephrotoxins and non-steroidal anti-inflammatory drugs (NSAIDs)   - follow-up labs with primary clinic provider at upcoming visit  Recent Labs   Lab 03/27/25  1121 03/25/25  1541 03/24/25  1000 03/23/25  1714   CR 0.74 0.85 0.93 1.18*     Hyperlipidemia  - Continue PTA lovastatin     Asthma  No sign of acute exacerbation.  - Continue PTA fluticasone propionate/salmeterol (Advair) on discharge  - Treatment for pneumonia as above    Urinary incontinence, chronic  Longstanding issues, exacerbated by IV fluid administration in hospital and acute illness.  - Continue to monitor, follow-up with outpatient clinic provider and/or urology.    Abdominal aortic aneurysm, 2.6 cm on CT 3/23/25, follow-up with primary clinic provider   -Incidental finding on admission CT, follow-up with outpatient primary clinic provider for surveillance and future imaging    Patient will call or seek medical attention if any worrisome signs or symptoms develop after discharge.  Patient agrees with plan as outlined and will follow up as recommended and outlined in the dismissal summary.    Consultations This Hospital Stay   NEUROSURGERY IP CONSULT  PHYSICAL THERAPY ADULT IP CONSULT  CARE MANAGEMENT / SOCIAL WORK IP CONSULT  NURSING TO CONSULT FOR VASCULAR ACCESS CARE IP CONSULT  NURSING TO CONSULT  FOR VASCULAR ACCESS CARE IP CONSULT  VASCULAR ACCESS ADULT IP CONSULT  WOUND OSTOMY CONTINENCE NURSE  IP CONSULT  CARE MANAGEMENT / SOCIAL WORK IP CONSULT  PHARMACY IP CONSULT    Code Status   Full Code    Time Spent on this Encounter   I, Kirk Chan MD, personally saw the patient today and spent greater than 30 minutes discharging this patient.       Kirk Chan MD  [facility]  [address] [place] [address]  Phone: [phone]  Fax: [phone]  ______________________________________________________________________    Physical Exam   Vital Signs: Temp: 98.1  F (36.7  C) Temp src: Oral BP: (!) 150/76 Pulse: 88   Resp: 18 SpO2: 95 % O2 Device: None (Room air)    Weight: 124 lbs 8 oz    GENERAL awake and alert, no acute distress, feels ready for discharge today, her  at the bedside in addition to nursing staff  LUNGS no wheezes or crackles, good inspiratory effort  HEART S1, S2 with RRR  ABDOMEN soft, nontender  EXTREMITIES without significant edema  SKIN warm and dry  NEURO moves upper and lower extremities spontaneously and to command  MENTAL STATUS answering questions and following simple commands       Primary Care Physician   Justin Saravia    Discharge Orders      Home Care Referral      Reason for your hospital stay    Hospitalized for bilateral pneumonia and back pain with compression fracture at T9 and central spinal canal stenosis.  IV antibiotics in hospital for pneumonia, completing therapy at time of discharge. Follow-up chest x-ray recommended in 3 to 4 weeks with primary clinic provider. Neurosurgery consulted for spine concerns with no inpatient intervention recommended, with outpatient follow-up in clinic.     Activity    Your activity upon discharge: light activity, advance slowly as tolerated     Follow Up    Okay to follow-up with outpatient [facility] neurosurgery clinic in 4 to 6 weeks regarding thoracic stenosis.   Please call our schedulers at [phone] to schedule your appointment or for any questions or concerns.     Jordon Order for DME - ONLY FOR DME     Diet    Follow this diet upon discharge: Current Diet:Orders Placed This Encounter      Combination Diet Regular Diet Adult     Hospital Follow-up with Existing Primary Care Provider (PCP)    Please see details below            Significant Results and Procedures   Most Recent 3 CBC's:  Recent Labs   Lab Test 03/27/25  1121 03/25/25  1541 03/24/25  1000   WBC 12.8* 16.4* 28.1*   HGB 10.8* 11.2* 11.2*   MCV 93 93 94    337 281     Most Recent 3 BMP's:  Recent Labs   Lab Test 03/27/25  1121 03/26/25  1857 03/25/25  1541 03/24/25  1000     --  132* 130*   POTASSIUM 4.4 4.4 4.3 3.8   CHLORIDE 98  --  96* 92*   CO2 29  --  28 25   BUN 25.6*  --  25.9* 24.4*   CR 0.74  --  0.85 0.93   ANIONGAP 9  --  8 13   DENI 9.2  --  8.9 9.1   *  --  91 126*   ,   Results for orders placed or performed during the hospital encounter of 03/23/25   Head CT w/o contrast    Narrative    EXAM: CT HEAD W/O CONTRAST  LOCATION: [facility]  DATE: 3/23/2025    INDICATION: Head injury  COMPARISON: None.  TECHNIQUE: Routine CT Head without IV contrast. Multiplanar reformats. Dose reduction techniques were used.    FINDINGS:  INTRACRANIAL CONTENTS: No intracranial hemorrhage, extraaxial collection, or mass effect.  No CT evidence of acute infarct. Mild presumed chronic small vessel ischemic changes. Moderate generalized volume loss. No hydrocephalus.     VISUALIZED ORBITS/SINUSES/MASTOIDS: No intraorbital abnormality. Left posterior nasal cavity prominent polyp. Sequelae functional endoscopic sinus surgery. Mild to moderate mucosal thickening scattered about the paranasal sinuses. Left sphenoid sinus   air-fluid level. Please correlate for acute sinusitis. Scattered fluid/membrane thickening in the mastoid air cells bilaterally.    BONES/SOFT TISSUES: No  acute abnormality.      Impression    IMPRESSION:  1.  No acute intracranial process.    2.  Age-related changes described above.    3.  Left sphenoid sinus air-fluid level. Please correlate for acute sinusitis.   CT Cervical Spine w/o Contrast    Narrative    EXAM: CT CERVICAL SPINE W/O CONTRAST  LOCATION: [facility]  DATE: 3/23/2025    INDICATION: Fall, acute on chronic neck pain  COMPARISON: None.  TECHNIQUE: Routine CT Cervical Spine without IV contrast. Multiplanar reformats. Dose reduction techniques were used.    FINDINGS:  VERTEBRA: No acute fracture within the cervical spine.  No acute post traumatic subluxations.   Normal vertebral body heights. No prevertebral soft tissue swelling.    CANAL/FORAMINA: Mild multilevel spondylosis. No significant central canal stenosis.  Various levels and degrees of neural foraminal stenosis.  No significant subluxations.    PARASPINAL: No significant extraspinal abnormality.      Impression     IMPRESSION:  1.  No acute fracture within the cervical spine.   CT Chest w Contrast    Narrative    EXAM: CT CHEST W CONTRAST  LOCATION: [facility]  DATE: 3/23/2025    INDICATION: Chronic cough, chest wall and back pain  COMPARISON: 5/8/2022  TECHNIQUE: CT chest with IV contrast. Multiplanar reformats were obtained. Dose reduction techniques were used.  LIMITATIONS: Motion Artifact    CONTRAST: 62mL Isovue 370    FINDINGS:   LUNGS AND PLEURA: There is new airspace disease in the inferior right middle lobe compatible with pneumonia. Milder subsegmental consolidation in the left lower lobe as well as patchy airspace nodules in the right lower lobe compatible with multifocal   pneumonitis. Trace left and small right pleural effusions.    MEDIASTINUM/AXILLAE: Normal heart size. Small pericardial effusion, slightly increased in interval. Precarinal adenopathy measuring 12 mm, image 57, with additional subcentimeter nodes increased in  prominence, likely reactive in the setting of infection.    CORONARY ARTERY CALCIFICATION: Moderate.    UPPER ABDOMEN: Unremarkable    MUSCULOSKELETAL: No acute bony abnormalities. Pectus carinatum.      Impression    IMPRESSION:   1.   CT findings compatible with multifocal pneumonia, greatest in the right middle lobe. Clinical correlation and follow-up to resolution recommended.  2.  Associated small bilateral pleural effusions, right greater than left.  3.  Likely reactive mediastinal adenopathy.   CT Lumbar Spine w/o Contrast    Narrative    EXAM: CT LUMBAR SPINE W/O CONTRAST  LOCATION: [facility]  DATE: 3/23/2025    INDICATION: Fall, low back pain  COMPARISON: None.  TECHNIQUE: Routine CT Lumbar Spine without IV contrast. Multiplanar reformats. Dose reduction techniques were used.     FINDINGS:  VERTEBRA: No acute fracture.  No acute post traumatic subluxations.   Normal vertebral body heights. Diffuse bony demineralization.    CANAL/FORAMINA: Thoracolumbar mild to moderate levoscoliosis. Multilevel spondylosis. Various levels and degrees of central canal stenosis.  L3-4 severe central canal stenosis. Various levels and degrees of neural foraminal stenosis.  No significant   subluxations. Bilateral SI degenerative joint disease.    PARASPINAL: Partially visualized hepatomegaly versus Riedel's lobe. Left kidney inferior pole cyst. Abdominal aortic aneurysm measuring 2.6 cm TR dimension.      Impression     IMPRESSION:  1.  No acute fracture.    2.  Chronic spine changes described above.    3.  Abdominal aortic aneurysm measuring 2.6 cm TR dimension.   CT Thoracic Spine w/o Contrast    Narrative    EXAM: CT THORACIC SPINE W/O CONTRAST  LOCATION: [facility]  DATE: 3/23/2025    INDICATION: Fall, thoracic back pain  COMPARISON: None.  TECHNIQUE: Routine CT Thoracic Spine without IV contrast. Multiplanar reformats. Dose reduction techniques were used.      FINDINGS:  VERTEBRA:   T9 vertebral body superior endplate osteophyte age-indeterminate fracture.     Otherwise, no acute fracture within the thoracic spine.  No acute post traumatic subluxations.   Normal vertebral body heights. Possible diffuse bony demineralization.    CANAL/FORAMINA: Exaggerated thoracic kyphosis. Multilevel spondylosis. T7-T8, T8-T9 severe central canal stenosis. Remaining thoracic levels demonstrate no significant central canal stenosis.  No significant neural foraminal stenosis.  No significant   subluxations.    PARASPINAL: Right lung demonstrates several patchy opacities. Right middle lobe consolidation. Small right pleural effusion. Mediastinal lymphadenopathy. Please defer to concomitant CT chest.      Impression     IMPRESSION:  1.  T9 vertebral body superior endplate osteophyte age-indeterminate fracture.    2.  Otherwise, no acute fracture within the thoracic spine.      3.  Chronic spine changes described above.         Discharge Medications   Current Discharge Medication List        START taking these medications    Details   acetaminophen (TYLENOL) 325 MG tablet Take 3 tablets (975 mg) by mouth 3 times daily as needed for pain.    Associated Diagnoses: Acute bilateral low back pain without sciatica; Infection due to 2019 novel coronavirus      guaiFENesin-dextromethorphan (ROBITUSSIN DM) 100-10 MG/5ML syrup Take 10 mLs by mouth 4 times daily as needed for cough.    Associated Diagnoses: Cough, unspecified type           CONTINUE these medications which have NOT CHANGED    Details   albuterol (ALBUTEROL) 108 (90 BASE) MCG/ACT inhaler Inhale 2 puffs into the lungs every 6 hours as needed  Qty: 3 Inhaler, Refills: prn    Comments: Profile Rx: patient will contact pharmacy when needed  Associated Diagnoses: Moderate persistent asthma; Routine general medical examination at a health care facility; Unspecified vitamin D deficiency; Other diseases of nasal cavity and sinuses(478.19);  Unspecified constipation; Urinary frequency; Irritable bowel syndrome; Hypersomnia with sleep apnea, unspecified; Obsessive-compulsive disorders; Anxiety state, unspecified; Upper arm pain, right; Hyperlipidemia LDL goal <100; Microscopic hematuria; Urinary urgency; Myalgia and myositis; Hyperlipidemia with target LDL less than 100; Paresthesias; LBP (low back pain)      calcium carbonate (OS-DENI) 500 MG tablet Take 1 tablet by mouth 2 times daily      fluticasone (FLONASE) 50 MCG/ACT nasal spray Spray 1-2 sprays into both nostrils daily as needed for rhinitis or allergies.      fluticasone-salmeterol (ADVAIR) 250-50 MCG/ACT inhaler Inhale 1 puff into the lungs every 12 hours.      lovastatin (MEVACOR) 40 MG tablet Take 1 tablet (40 mg) by mouth daily  Qty: 90 tablet, Refills: 3    Associated Diagnoses: Hyperlipidemia LDL goal <100      MULTI-VITAMIN OR TABS Take 1 tablet by mouth daily.      Omega-3 Fatty Acids (EPA PO) Take 1 teaspoonful by mouth daily    Associated Diagnoses: Routine general medical examination at a health care facility; Unspecified vitamin D deficiency; Moderate persistent asthma; Other diseases of nasal cavity and sinuses(478.19); Unspecified constipation; Urinary frequency; Irritable bowel syndrome; Hypersomnia with sleep apnea, unspecified; Obsessive-compulsive disorders; Anxiety state, unspecified; Upper arm pain, right; Hyperlipidemia LDL goal <100; Microscopic hematuria; Urinary urgency; Myalgia and myositis; Hyperlipidemia with target LDL less than 100; Paresthesias; LBP (low back pain)           Allergies   Allergies   Allergen Reactions    Alatrofloxacin Mesylate Injection     Aspirin     Cefprozil     Erythromycin      Allergic all mycins    Nsaids     Sulfa Antibiotics     Levaquin [Levofloxacin] Rash

## 2025-03-28 NOTE — PLAN OF CARE
Physical Therapy Discharge Summary    Reason for therapy discharge:    All goals and outcomes met, no further needs identified.    Progress towards therapy goal(s). [facility] for goal details.  Goals met    Therapy recommendation(s):    Continued therapy is recommended.  Rationale/Recommendations:  Pt is below baseline. Pt currently requiring SBA for all functional mobility. Pt presents with deficits in activity tolerance, balance, and strength. Due to these deficits, pt would benefit from continued skilled PT services via HHPT to address deficits and improve IND with safety and functional mobility. Pt's spouse will be present to assist pt as needed. Recommend pt use a FWW at discharge for improved stability and support..

## 2025-03-28 NOTE — PLAN OF CARE
Goal Outcome Evaluation:       Shift Summary [id number]     Admitting Diagnosis: Falls frequently [R29.6]  Closed fracture of thoracic vertebral body (H) [S22.009A]  Multifocal pneumonia [J18.9]  Sepsis without acute organ dysfunction, due to unspecified organism (H) [A41.9]   Vitals VSS on RA  Pain managed by scheduled meds   A&Ox4  Voiding BR  Mobility Ax1 GB/W  CMS Intact  GI No BM @ this shift.  Dressing Scattered bruises and wound of coccyx, Meplex intact.  IV SL  Orders Placed This Encounter      Combination Diet Regular Diet Adult     Plan: TBD, Referrals sent for Home Care PT

## 2025-03-28 NOTE — PROGRESS NOTES
Mental Status: A/O x4  Activity/dangle: Assist x1 with GB and walker  Diet: Regular   Pain: Denies   [place]/Voiding: Bathroom  02/LDA: On RA. PIV = SL  D/C Date: today 3/28/2025

## 2025-03-28 NOTE — PROGRESS NOTES
Care Management Discharge Note    Discharge Date: 03/28/2025       Discharge Disposition: Home, Home Care    Discharge Services:  [facility] PT/RN via [employer]    Discharge DME:none       Discharge Transportation: family or friend will provide    Private pay costs discussed: Not applicable    Does the patient's insurance plan have a 3 day qualifying hospital stay waiver?  No      Education Provided on the Discharge Plan: Yes  Persons Notified of Discharge Plans: Patient and   Patient/Family in Agreement with the Plan:  yes    Handoff Referral Completed: Yes, non-[facility] PCP: External handoff communication completed    Additional Information:Follow up appointment made for Wednesday April 2, 2025 11:00 am Dr Lan at [address] [place] [address].  Phone [phone].  Discharge orders were faxed to PCP Justin Saravia at [phone]. Patient informed she should have CBC, BMP drawn at this appointment and labs should be ordered by PCP.  Writer informed patient and  that she will also need an Xray ordered and done in 3-4 weeks to follow up with pneumonia and her PCP should order that for patient at Follow up.  Writer called [facility] to let them know order were faxed to [phone].   Pt's  brought in a walker to be checked by PT that they had at home, they will take it with them today.      Ester Gonsalez, Care Management RN, [facility] - FLOAT   Contact: via [facility]

## 2025-03-30 ENCOUNTER — PATIENT OUTREACH (OUTPATIENT)
Dept: CARE COORDINATION | Facility: CLINIC | Age: 80
End: 2025-03-30
Payer: COMMERCIAL

## 2025-03-30 NOTE — PROGRESS NOTES
[facility] Contact  [facility]/Voicemail     Clinical Data: Post-Discharge Outreach     Outreach attempted x 2.  Left message on  patient's spouse's voicemail (consent to communicate on file) , providing [facility]'s central phone number of [phone] ([phone]) for questions/concerns and/or to schedule an appt with an [facility] provider, if they do not have a PCP.      Plan:  [facility] will do no further outreaches at this time.       DARVIN Wood  Connected Care Resource [place], [facility]    *Connected Care Resource Team does NOT follow patient ongoing. Referrals are identified based on internal discharge reports and the outreach is to ensure patient has an understanding of their discharge instructions.